# Patient Record
Sex: MALE | Race: BLACK OR AFRICAN AMERICAN | NOT HISPANIC OR LATINO | Employment: OTHER | RURAL
[De-identification: names, ages, dates, MRNs, and addresses within clinical notes are randomized per-mention and may not be internally consistent; named-entity substitution may affect disease eponyms.]

---

## 2019-04-11 ENCOUNTER — HISTORICAL (OUTPATIENT)
Dept: ADMINISTRATIVE | Facility: HOSPITAL | Age: 84
End: 2019-04-11

## 2019-04-12 LAB
LAB AP CLINICAL INFORMATION: NORMAL
LAB AP COMMENTS: NORMAL
LAB AP DIAGNOSIS - HISTORICAL: NORMAL
LAB AP GROSS PATHOLOGY - HISTORICAL: NORMAL
LAB AP SPECIMEN SUBMITTED - HISTORICAL: NORMAL

## 2020-11-25 ENCOUNTER — HISTORICAL (OUTPATIENT)
Dept: ADMINISTRATIVE | Facility: HOSPITAL | Age: 85
End: 2020-11-25

## 2020-11-25 LAB
ANION GAP SERPL CALCULATED.3IONS-SCNC: 12 MMOL/L
BASOPHILS # BLD AUTO: 0.02 X10E3/UL (ref 0–0.2)
BASOPHILS NFR BLD AUTO: 0.5 % (ref 0–1)
BUN SERPL-MCNC: 17 MG/DL (ref 9–20)
CALCIUM SERPL-MCNC: 9.9 MG/DL (ref 8.5–10.1)
CHLORIDE SERPL-SCNC: 105 MMOL/L (ref 98–107)
CO2 SERPL-SCNC: 29 MMOL/L (ref 21–32)
CREAT SERPL-MCNC: 0.88 MG/DL (ref 0.66–1.25)
EOSINOPHIL # BLD AUTO: 0.29 X10E3/UL (ref 0–0.5)
EOSINOPHIL NFR BLD AUTO: 6.7 % (ref 1–4)
ERYTHROCYTE [DISTWIDTH] IN BLOOD BY AUTOMATED COUNT: 12.4 % (ref 11.5–14.5)
GLUCOSE SERPL-MCNC: 93 MG/DL (ref 74–106)
HCT VFR BLD AUTO: 37.1 % (ref 40–54)
HGB BLD-MCNC: 12.2 G/DL (ref 13.5–18)
LYMPHOCYTES # BLD AUTO: 1.68 X10E3/UL (ref 1–4.8)
LYMPHOCYTES NFR BLD AUTO: 38.5 % (ref 27–41)
MCH RBC QN AUTO: 30 PG (ref 27–31)
MCHC RBC AUTO-ENTMCNC: 32.9 G/DL (ref 32–36)
MCV RBC AUTO: 91 FL (ref 80–96)
MONOCYTES # BLD AUTO: 0.6 X10E3/UL (ref 0–0.8)
MONOCYTES NFR BLD AUTO: 13.8 % (ref 2–6)
MPC BLD CALC-MCNC: 11 FL (ref 9.4–12.4)
NEUTROPHILS # BLD AUTO: 1.77 X10E3/UL (ref 1.8–7.7)
NEUTROPHILS NFR BLD AUTO: 40.5 % (ref 53–65)
PLATELET # BLD AUTO: 156 X10E3/UL (ref 150–400)
POTASSIUM SERPL-SCNC: 4.1 MMOL/L (ref 3.5–5.1)
RBC # BLD AUTO: 4.07 X10E6/UL (ref 4.6–6.2)
SODIUM SERPL-SCNC: 142 MMOL/L (ref 136–145)
WBC # BLD AUTO: 4.36 X10E3/UL (ref 4.5–11)

## 2021-04-19 ENCOUNTER — OFFICE VISIT (OUTPATIENT)
Dept: UROLOGY | Facility: CLINIC | Age: 86
End: 2021-04-19
Payer: MEDICARE

## 2021-04-19 VITALS
BODY MASS INDEX: 21.47 KG/M2 | SYSTOLIC BLOOD PRESSURE: 153 MMHG | DIASTOLIC BLOOD PRESSURE: 83 MMHG | HEART RATE: 64 BPM | WEIGHT: 150 LBS | HEIGHT: 70 IN

## 2021-04-19 DIAGNOSIS — C61 MALIGNANT NEOPLASM OF PROSTATE: Primary | ICD-10-CM

## 2021-04-19 DIAGNOSIS — N41.1 CHRONIC PROSTATITIS: ICD-10-CM

## 2021-04-19 DIAGNOSIS — C61 MALIGNANT TUMOR OF PROSTATE: Primary | ICD-10-CM

## 2021-04-19 DIAGNOSIS — N40.2 PROSTATE NODULE: ICD-10-CM

## 2021-04-19 DIAGNOSIS — R97.20 ELEVATED PSA: ICD-10-CM

## 2021-04-19 PROCEDURE — 99214 PR OFFICE/OUTPT VISIT, EST, LEVL IV, 30-39 MIN: ICD-10-PCS | Mod: S$PBB,,, | Performed by: UROLOGY

## 2021-04-19 PROCEDURE — 99214 OFFICE O/P EST MOD 30 MIN: CPT | Mod: S$PBB,,, | Performed by: UROLOGY

## 2021-04-19 PROCEDURE — 99999 PR PBB SHADOW E&M-EST. PATIENT-LVL III: ICD-10-PCS | Mod: PBBFAC,,, | Performed by: UROLOGY

## 2021-04-19 PROCEDURE — 99999 PR PBB SHADOW E&M-EST. PATIENT-LVL III: CPT | Mod: PBBFAC,,, | Performed by: UROLOGY

## 2021-04-19 PROCEDURE — 99213 OFFICE O/P EST LOW 20 MIN: CPT | Mod: PBBFAC | Performed by: UROLOGY

## 2021-04-19 RX ORDER — ATORVASTATIN CALCIUM 20 MG/1
20 TABLET, FILM COATED ORAL DAILY
COMMUNITY
Start: 2021-04-07 | End: 2021-07-09 | Stop reason: SDUPTHER

## 2021-04-19 RX ORDER — LABETALOL 100 MG/1
100 TABLET, FILM COATED ORAL 2 TIMES DAILY
COMMUNITY
Start: 2021-04-16 | End: 2021-07-09 | Stop reason: SDUPTHER

## 2021-04-19 RX ORDER — AMLODIPINE BESYLATE 5 MG/1
5 TABLET ORAL 2 TIMES DAILY
COMMUNITY
Start: 2021-03-18 | End: 2021-07-09 | Stop reason: SDUPTHER

## 2021-06-03 DIAGNOSIS — E78.5 HYPERLIPIDEMIA, UNSPECIFIED HYPERLIPIDEMIA TYPE: Primary | ICD-10-CM

## 2021-06-03 RX ORDER — ATORVASTATIN CALCIUM 20 MG/1
20 TABLET, FILM COATED ORAL DAILY
Qty: 30 TABLET | Refills: 0 | Status: CANCELLED | OUTPATIENT
Start: 2021-06-03

## 2021-07-09 ENCOUNTER — OFFICE VISIT (OUTPATIENT)
Dept: FAMILY MEDICINE | Facility: CLINIC | Age: 86
End: 2021-07-09
Payer: MEDICARE

## 2021-07-09 VITALS
SYSTOLIC BLOOD PRESSURE: 153 MMHG | BODY MASS INDEX: 20.95 KG/M2 | DIASTOLIC BLOOD PRESSURE: 71 MMHG | WEIGHT: 146 LBS | HEART RATE: 58 BPM

## 2021-07-09 DIAGNOSIS — E78.5 HYPERLIPIDEMIA, UNSPECIFIED HYPERLIPIDEMIA TYPE: ICD-10-CM

## 2021-07-09 DIAGNOSIS — I10 ESSENTIAL (PRIMARY) HYPERTENSION: Primary | ICD-10-CM

## 2021-07-09 PROCEDURE — 99213 OFFICE O/P EST LOW 20 MIN: CPT | Mod: ,,, | Performed by: NURSE PRACTITIONER

## 2021-07-09 PROCEDURE — 99213 PR OFFICE/OUTPT VISIT, EST, LEVL III, 20-29 MIN: ICD-10-PCS | Mod: ,,, | Performed by: NURSE PRACTITIONER

## 2021-07-09 RX ORDER — LABETALOL 100 MG/1
100 TABLET, FILM COATED ORAL 2 TIMES DAILY
Qty: 180 TABLET | Refills: 0 | Status: SHIPPED | OUTPATIENT
Start: 2021-07-09 | End: 2021-10-20

## 2021-07-09 RX ORDER — AMLODIPINE BESYLATE 5 MG/1
5 TABLET ORAL 2 TIMES DAILY
Qty: 90 TABLET | Refills: 0 | Status: SHIPPED | OUTPATIENT
Start: 2021-07-09 | End: 2021-10-20

## 2021-07-09 RX ORDER — ATORVASTATIN CALCIUM 20 MG/1
20 TABLET, FILM COATED ORAL DAILY
Qty: 90 TABLET | Refills: 0 | Status: SHIPPED | OUTPATIENT
Start: 2021-07-09 | End: 2021-10-20

## 2021-07-16 DIAGNOSIS — E78.5 HYPERLIPIDEMIA, UNSPECIFIED HYPERLIPIDEMIA TYPE: Primary | ICD-10-CM

## 2021-07-16 DIAGNOSIS — I10 ESSENTIAL (PRIMARY) HYPERTENSION: ICD-10-CM

## 2021-10-19 ENCOUNTER — TELEPHONE (OUTPATIENT)
Dept: FAMILY MEDICINE | Facility: CLINIC | Age: 86
End: 2021-10-19

## 2021-10-19 DIAGNOSIS — I10 ESSENTIAL (PRIMARY) HYPERTENSION: ICD-10-CM

## 2021-10-20 ENCOUNTER — OFFICE VISIT (OUTPATIENT)
Dept: UROLOGY | Facility: CLINIC | Age: 86
End: 2021-10-20
Payer: MEDICARE

## 2021-10-20 VITALS
HEIGHT: 70 IN | WEIGHT: 142 LBS | BODY MASS INDEX: 20.33 KG/M2 | HEART RATE: 68 BPM | SYSTOLIC BLOOD PRESSURE: 162 MMHG | DIASTOLIC BLOOD PRESSURE: 82 MMHG

## 2021-10-20 DIAGNOSIS — R97.20 ELEVATED PSA: ICD-10-CM

## 2021-10-20 DIAGNOSIS — C61 MALIGNANT NEOPLASM OF PROSTATE: Primary | ICD-10-CM

## 2021-10-20 DIAGNOSIS — I10 ESSENTIAL (PRIMARY) HYPERTENSION: ICD-10-CM

## 2021-10-20 DIAGNOSIS — N32.0 BLADDER OUTLET OBSTRUCTION: ICD-10-CM

## 2021-10-20 DIAGNOSIS — E78.5 HYPERLIPIDEMIA, UNSPECIFIED HYPERLIPIDEMIA TYPE: ICD-10-CM

## 2021-10-20 PROCEDURE — 99213 PR OFFICE/OUTPT VISIT, EST, LEVL III, 20-29 MIN: ICD-10-PCS | Mod: S$PBB,,, | Performed by: UROLOGY

## 2021-10-20 PROCEDURE — 99213 OFFICE O/P EST LOW 20 MIN: CPT | Mod: S$PBB,,, | Performed by: UROLOGY

## 2021-10-20 PROCEDURE — 99214 OFFICE O/P EST MOD 30 MIN: CPT | Mod: PBBFAC | Performed by: UROLOGY

## 2021-10-20 RX ORDER — AMLODIPINE BESYLATE 5 MG/1
TABLET ORAL
Qty: 42 TABLET | Refills: 0 | Status: SHIPPED | OUTPATIENT
Start: 2021-10-20 | End: 2021-10-22 | Stop reason: SDUPTHER

## 2021-10-20 RX ORDER — ATORVASTATIN CALCIUM 20 MG/1
TABLET, FILM COATED ORAL
Qty: 21 TABLET | Refills: 0 | Status: SHIPPED | OUTPATIENT
Start: 2021-10-20 | End: 2021-11-09 | Stop reason: SDUPTHER

## 2021-10-20 RX ORDER — LABETALOL 100 MG/1
TABLET, FILM COATED ORAL
Qty: 42 TABLET | Refills: 0 | Status: SHIPPED | OUTPATIENT
Start: 2021-10-20 | End: 2021-10-22 | Stop reason: SDUPTHER

## 2021-10-22 DIAGNOSIS — I10 ESSENTIAL (PRIMARY) HYPERTENSION: ICD-10-CM

## 2021-10-22 RX ORDER — AMLODIPINE BESYLATE 5 MG/1
5 TABLET ORAL 2 TIMES DAILY
Qty: 60 TABLET | Refills: 0 | Status: SHIPPED | OUTPATIENT
Start: 2021-10-22 | End: 2021-11-09 | Stop reason: SDUPTHER

## 2021-10-22 RX ORDER — LABETALOL 100 MG/1
100 TABLET, FILM COATED ORAL 2 TIMES DAILY
Qty: 60 TABLET | Refills: 0 | Status: SHIPPED | OUTPATIENT
Start: 2021-10-22 | End: 2021-11-09 | Stop reason: SDUPTHER

## 2021-11-09 ENCOUNTER — OFFICE VISIT (OUTPATIENT)
Dept: FAMILY MEDICINE | Facility: CLINIC | Age: 86
End: 2021-11-09
Payer: MEDICARE

## 2021-11-09 VITALS
BODY MASS INDEX: 20.62 KG/M2 | OXYGEN SATURATION: 98 % | WEIGHT: 144 LBS | DIASTOLIC BLOOD PRESSURE: 68 MMHG | HEART RATE: 54 BPM | HEIGHT: 70 IN | SYSTOLIC BLOOD PRESSURE: 145 MMHG | TEMPERATURE: 98 F

## 2021-11-09 DIAGNOSIS — E78.5 HYPERLIPIDEMIA, UNSPECIFIED HYPERLIPIDEMIA TYPE: ICD-10-CM

## 2021-11-09 DIAGNOSIS — I10 ESSENTIAL (PRIMARY) HYPERTENSION: Primary | ICD-10-CM

## 2021-11-09 PROCEDURE — 99213 OFFICE O/P EST LOW 20 MIN: CPT | Mod: ,,, | Performed by: NURSE PRACTITIONER

## 2021-11-09 PROCEDURE — 99213 PR OFFICE/OUTPT VISIT, EST, LEVL III, 20-29 MIN: ICD-10-PCS | Mod: ,,, | Performed by: NURSE PRACTITIONER

## 2021-11-09 RX ORDER — LABETALOL 100 MG/1
100 TABLET, FILM COATED ORAL 2 TIMES DAILY
Qty: 180 TABLET | Refills: 0 | Status: SHIPPED | OUTPATIENT
Start: 2021-11-09 | End: 2022-03-11 | Stop reason: SDUPTHER

## 2021-11-09 RX ORDER — AMLODIPINE BESYLATE 5 MG/1
5 TABLET ORAL 2 TIMES DAILY
Qty: 180 TABLET | Refills: 0 | Status: SHIPPED | OUTPATIENT
Start: 2021-11-09 | End: 2022-03-11 | Stop reason: SDUPTHER

## 2021-11-09 RX ORDER — ATORVASTATIN CALCIUM 20 MG/1
20 TABLET, FILM COATED ORAL NIGHTLY
Qty: 90 TABLET | Refills: 0 | Status: SHIPPED | OUTPATIENT
Start: 2021-11-09 | End: 2022-04-04 | Stop reason: SDUPTHER

## 2021-11-12 ENCOUNTER — HOSPITAL ENCOUNTER (EMERGENCY)
Facility: HOSPITAL | Age: 86
Discharge: HOME | End: 2021-11-12
Payer: MEDICARE

## 2021-11-12 VITALS
DIASTOLIC BLOOD PRESSURE: 71 MMHG | BODY MASS INDEX: 22.98 KG/M2 | WEIGHT: 143 LBS | SYSTOLIC BLOOD PRESSURE: 138 MMHG | TEMPERATURE: 98 F | RESPIRATION RATE: 16 BRPM | HEART RATE: 58 BPM | HEIGHT: 66 IN | OXYGEN SATURATION: 99 %
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
## 2021-11-12 DIAGNOSIS — S00.83XA FACIAL CONTUSION, INITIAL ENCOUNTER: ICD-10-CM

## 2021-11-12 DIAGNOSIS — S80.212A ABRASION, LEFT KNEE, INITIAL ENCOUNTER: ICD-10-CM

## 2021-11-12 DIAGNOSIS — S09.90XA INJURY OF HEAD, INITIAL ENCOUNTER: Primary | ICD-10-CM

## 2021-11-12 LAB
ANION GAP SERPL CALCULATED.3IONS-SCNC: 9 MMOL/L (ref 7–16)
BASOPHILS # BLD AUTO: 0.01 K/UL (ref 0–0.2)
BASOPHILS NFR BLD AUTO: 0.2 % (ref 0–1)
BILIRUB UR QL STRIP: NEGATIVE
BUN SERPL-MCNC: 16 MG/DL (ref 7–18)
BUN/CREAT SERPL: 19 (ref 6–20)
CALCIUM SERPL-MCNC: 9 MG/DL (ref 8.5–10.1)
CHLORIDE SERPL-SCNC: 107 MMOL/L (ref 98–107)
CLARITY UR: CLEAR
CO2 SERPL-SCNC: 29 MMOL/L (ref 21–32)
COLOR UR: YELLOW
CREAT SERPL-MCNC: 0.85 MG/DL (ref 0.7–1.3)
DIFFERENTIAL METHOD BLD: ABNORMAL
EOSINOPHIL # BLD AUTO: 0.27 K/UL (ref 0–0.5)
EOSINOPHIL NFR BLD AUTO: 5.6 % (ref 1–4)
ERYTHROCYTE [DISTWIDTH] IN BLOOD BY AUTOMATED COUNT: 12.2 % (ref 11.5–14.5)
GLUCOSE SERPL-MCNC: 93 MG/DL (ref 74–106)
GLUCOSE UR STRIP-MCNC: NEGATIVE MG/DL
HCT VFR BLD AUTO: 37.4 % (ref 40–54)
HGB BLD-MCNC: 12.1 G/DL (ref 13.5–18)
KETONES UR STRIP-SCNC: NEGATIVE MG/DL
LEUKOCYTE ESTERASE UR QL STRIP: NEGATIVE
LYMPHOCYTES # BLD AUTO: 1.45 K/UL (ref 1–4.8)
LYMPHOCYTES NFR BLD AUTO: 30 % (ref 27–41)
MCH RBC QN AUTO: 30.4 PG (ref 27–31)
MCHC RBC AUTO-ENTMCNC: 32.4 G/DL (ref 32–36)
MCV RBC AUTO: 94 FL (ref 80–96)
MONOCYTES # BLD AUTO: 0.66 K/UL (ref 0–0.8)
MONOCYTES NFR BLD AUTO: 13.6 % (ref 2–6)
MPC BLD CALC-MCNC: 10.3 FL (ref 9.4–12.4)
NEUTROPHILS # BLD AUTO: 2.45 K/UL (ref 1.8–7.7)
NEUTROPHILS NFR BLD AUTO: 50.6 % (ref 53–65)
NITRITE UR QL STRIP: NEGATIVE
PH UR STRIP: 6.5 PH UNITS
PLATELET # BLD AUTO: 143 K/UL (ref 150–400)
POTASSIUM SERPL-SCNC: 4.3 MMOL/L (ref 3.5–5.1)
PROT UR QL STRIP: NEGATIVE
RBC # BLD AUTO: 3.98 M/UL (ref 4.6–6.2)
RBC # UR STRIP: NEGATIVE /UL
SODIUM SERPL-SCNC: 141 MMOL/L (ref 136–145)
SP GR UR STRIP: 1.02
UROBILINOGEN UR STRIP-ACNC: 0.2 MG/DL
WBC # BLD AUTO: 4.84 K/UL (ref 4.5–11)

## 2021-11-12 PROCEDURE — 85025 COMPLETE CBC W/AUTO DIFF WBC: CPT | Performed by: NURSE PRACTITIONER

## 2021-11-12 PROCEDURE — 63600175 PHARM REV CODE 636 W HCPCS: Performed by: NURSE PRACTITIONER

## 2021-11-12 PROCEDURE — 90471 IMMUNIZATION ADMIN: CPT | Performed by: NURSE PRACTITIONER

## 2021-11-12 PROCEDURE — 81003 URINALYSIS AUTO W/O SCOPE: CPT | Performed by: NURSE PRACTITIONER

## 2021-11-12 PROCEDURE — 99285 EMERGENCY DEPT VISIT HI MDM: CPT | Mod: 25

## 2021-11-12 PROCEDURE — 36415 COLL VENOUS BLD VENIPUNCTURE: CPT | Performed by: NURSE PRACTITIONER

## 2021-11-12 PROCEDURE — 80048 BASIC METABOLIC PNL TOTAL CA: CPT | Performed by: NURSE PRACTITIONER

## 2021-11-12 PROCEDURE — 90715 TDAP VACCINE 7 YRS/> IM: CPT | Performed by: NURSE PRACTITIONER

## 2021-11-12 PROCEDURE — 99284 EMERGENCY DEPT VISIT MOD MDM: CPT | Mod: GF | Performed by: NURSE PRACTITIONER

## 2021-11-12 RX ADMIN — TETANUS TOXOID, REDUCED DIPHTHERIA TOXOID AND ACELLULAR PERTUSSIS VACCINE, ADSORBED 0.5 ML: 5; 2.5; 8; 8; 2.5 SUSPENSION INTRAMUSCULAR at 09:11

## 2021-11-13 ENCOUNTER — TELEPHONE (OUTPATIENT)
Dept: EMERGENCY MEDICINE | Facility: HOSPITAL | Age: 86
End: 2021-11-13
Payer: MEDICARE

## 2022-03-11 ENCOUNTER — OFFICE VISIT (OUTPATIENT)
Dept: FAMILY MEDICINE | Facility: CLINIC | Age: 87
End: 2022-03-11
Payer: MEDICARE

## 2022-03-11 VITALS
WEIGHT: 143 LBS | HEART RATE: 47 BPM | DIASTOLIC BLOOD PRESSURE: 76 MMHG | BODY MASS INDEX: 22.98 KG/M2 | SYSTOLIC BLOOD PRESSURE: 162 MMHG | HEIGHT: 66 IN

## 2022-03-11 DIAGNOSIS — I10 ESSENTIAL (PRIMARY) HYPERTENSION: ICD-10-CM

## 2022-03-11 DIAGNOSIS — Z71.89 COMPLEX CARE COORDINATION: ICD-10-CM

## 2022-03-11 PROCEDURE — 99213 OFFICE O/P EST LOW 20 MIN: CPT | Mod: ,,, | Performed by: NURSE PRACTITIONER

## 2022-03-11 PROCEDURE — 99213 PR OFFICE/OUTPT VISIT, EST, LEVL III, 20-29 MIN: ICD-10-PCS | Mod: ,,, | Performed by: NURSE PRACTITIONER

## 2022-03-11 RX ORDER — LABETALOL 100 MG/1
100 TABLET, FILM COATED ORAL 2 TIMES DAILY
Qty: 180 TABLET | Refills: 0 | Status: SHIPPED | OUTPATIENT
Start: 2022-03-11 | End: 2022-04-04 | Stop reason: SDUPTHER

## 2022-03-11 RX ORDER — AMLODIPINE BESYLATE 5 MG/1
5 TABLET ORAL 2 TIMES DAILY
Qty: 180 TABLET | Refills: 0 | Status: SHIPPED | OUTPATIENT
Start: 2022-03-11 | End: 2022-04-04 | Stop reason: SDUPTHER

## 2022-03-11 NOTE — PROGRESS NOTES
"Subjective:         Patient ID: Gavin Madsen is a 87 y.o. male.    Chief Complaint: Medication Refill      Did not take bp meds today      Review of Systems   Constitutional: Negative.    Eyes: Negative.    Respiratory: Negative.    Cardiovascular: Negative.    Gastrointestinal: Negative.    Endocrine: Negative.    Genitourinary: Negative.    Musculoskeletal: Negative.    Neurological: Negative.    Psychiatric/Behavioral: Negative.        Patient Active Problem List   Diagnosis    Essential (primary) hypertension    Hyperlipidemia           Objective:        BP (!) 162/76   Pulse (!) 47   Ht 5' 6" (1.676 m)   Wt 64.9 kg (143 lb)   BMI 23.08 kg/m²     Estimated body mass index is 23.08 kg/m² as calculated from the following:    Height as of this encounter: 5' 6" (1.676 m).    Weight as of this encounter: 64.9 kg (143 lb).    Physical Exam  Vitals reviewed.   Constitutional:       Appearance: Normal appearance.   Eyes:      Extraocular Movements: Extraocular movements intact.   Cardiovascular:      Rate and Rhythm: Normal rate and regular rhythm.      Heart sounds: Normal heart sounds.   Pulmonary:      Effort: Pulmonary effort is normal.      Breath sounds: Normal breath sounds.   Abdominal:      General: Abdomen is flat.      Palpations: Abdomen is soft.   Musculoskeletal:         General: Normal range of motion.   Skin:     General: Skin is warm and dry.   Neurological:      Mental Status: He is alert.   Psychiatric:         Behavior: Behavior normal.             Assessment:         1. Essential (primary) hypertension            Plan:         Problem List Items Addressed This Visit        Cardiac/Vascular    Essential (primary) hypertension                Follow Up:     No follow-ups on file.          Joselin Meadows        "

## 2022-04-04 ENCOUNTER — OFFICE VISIT (OUTPATIENT)
Dept: FAMILY MEDICINE | Facility: CLINIC | Age: 87
End: 2022-04-04
Payer: MEDICARE

## 2022-04-04 VITALS
WEIGHT: 143 LBS | DIASTOLIC BLOOD PRESSURE: 71 MMHG | TEMPERATURE: 98 F | BODY MASS INDEX: 22.98 KG/M2 | SYSTOLIC BLOOD PRESSURE: 136 MMHG | HEIGHT: 66 IN | OXYGEN SATURATION: 99 % | HEART RATE: 56 BPM

## 2022-04-04 DIAGNOSIS — I10 ESSENTIAL HYPERTENSION: Primary | ICD-10-CM

## 2022-04-04 DIAGNOSIS — I10 ESSENTIAL (PRIMARY) HYPERTENSION: ICD-10-CM

## 2022-04-04 DIAGNOSIS — E78.5 HYPERLIPIDEMIA, UNSPECIFIED HYPERLIPIDEMIA TYPE: ICD-10-CM

## 2022-04-04 PROCEDURE — 85025 CBC WITH DIFFERENTIAL: ICD-10-PCS | Mod: ,,, | Performed by: CLINICAL MEDICAL LABORATORY

## 2022-04-04 PROCEDURE — 99214 PR OFFICE/OUTPT VISIT, EST, LEVL IV, 30-39 MIN: ICD-10-PCS | Mod: ,,, | Performed by: NURSE PRACTITIONER

## 2022-04-04 PROCEDURE — 99214 OFFICE O/P EST MOD 30 MIN: CPT | Mod: ,,, | Performed by: NURSE PRACTITIONER

## 2022-04-04 PROCEDURE — 80053 COMPREHENSIVE METABOLIC PANEL: ICD-10-PCS | Mod: ,,, | Performed by: CLINICAL MEDICAL LABORATORY

## 2022-04-04 PROCEDURE — 80053 COMPREHEN METABOLIC PANEL: CPT | Mod: ,,, | Performed by: CLINICAL MEDICAL LABORATORY

## 2022-04-04 PROCEDURE — 85025 COMPLETE CBC W/AUTO DIFF WBC: CPT | Mod: ,,, | Performed by: CLINICAL MEDICAL LABORATORY

## 2022-04-04 RX ORDER — ATORVASTATIN CALCIUM 20 MG/1
20 TABLET, FILM COATED ORAL NIGHTLY
Qty: 90 TABLET | Refills: 0 | Status: SHIPPED | OUTPATIENT
Start: 2022-04-04 | End: 2022-06-30 | Stop reason: SDUPTHER

## 2022-04-04 RX ORDER — AMLODIPINE BESYLATE 5 MG/1
5 TABLET ORAL 2 TIMES DAILY
Qty: 180 TABLET | Refills: 0 | Status: SHIPPED | OUTPATIENT
Start: 2022-04-04 | End: 2022-09-08 | Stop reason: SDUPTHER

## 2022-04-04 RX ORDER — LABETALOL 100 MG/1
100 TABLET, FILM COATED ORAL 2 TIMES DAILY
Qty: 180 TABLET | Refills: 0 | Status: SHIPPED | OUTPATIENT
Start: 2022-04-04 | End: 2022-09-08 | Stop reason: SDUPTHER

## 2022-04-04 NOTE — PATIENT INSTRUCTIONS
Lab obtained in clinic today, we will notify you of results and any necessary changes to plan of care     Refills on medication   Follow up in three months and as needed

## 2022-04-05 LAB
ALBUMIN SERPL BCP-MCNC: 4 G/DL (ref 3.5–5)
ALBUMIN/GLOB SERPL: 1.3 {RATIO}
ALP SERPL-CCNC: 70 U/L (ref 45–115)
ALT SERPL W P-5'-P-CCNC: 29 U/L (ref 16–61)
ANION GAP SERPL CALCULATED.3IONS-SCNC: 5 MMOL/L (ref 7–16)
AST SERPL W P-5'-P-CCNC: 21 U/L (ref 15–37)
BASOPHILS # BLD AUTO: 0.05 K/UL (ref 0–0.2)
BASOPHILS NFR BLD AUTO: 1 % (ref 0–1)
BASOPHILS NFR BLD MANUAL: 2 % (ref 0–1)
BILIRUB SERPL-MCNC: 0.6 MG/DL (ref 0–1.2)
BUN SERPL-MCNC: 20 MG/DL (ref 7–18)
BUN/CREAT SERPL: 21 (ref 6–20)
CALCIUM SERPL-MCNC: 9.6 MG/DL (ref 8.5–10.1)
CHLORIDE SERPL-SCNC: 110 MMOL/L (ref 98–107)
CO2 SERPL-SCNC: 30 MMOL/L (ref 21–32)
CREAT SERPL-MCNC: 0.97 MG/DL (ref 0.7–1.3)
DIFFERENTIAL METHOD BLD: ABNORMAL
EOSINOPHIL # BLD AUTO: 0.3 K/UL (ref 0–0.5)
EOSINOPHIL NFR BLD AUTO: 5.7 % (ref 1–4)
EOSINOPHIL NFR BLD MANUAL: 11 % (ref 1–4)
ERYTHROCYTE [DISTWIDTH] IN BLOOD BY AUTOMATED COUNT: 12.4 % (ref 11.5–14.5)
GLOBULIN SER-MCNC: 3 G/DL (ref 2–4)
GLUCOSE SERPL-MCNC: 96 MG/DL (ref 74–106)
HCT VFR BLD AUTO: 37.6 % (ref 40–54)
HGB BLD-MCNC: 12 G/DL (ref 13.5–18)
IMM GRANULOCYTES # BLD AUTO: 0 K/UL (ref 0–0.04)
IMM GRANULOCYTES NFR BLD: 0 % (ref 0–0.4)
LYMPHOCYTES # BLD AUTO: 2.11 K/UL (ref 1–4.8)
LYMPHOCYTES NFR BLD AUTO: 40.3 % (ref 27–41)
LYMPHOCYTES NFR BLD MANUAL: 49 % (ref 27–41)
MCH RBC QN AUTO: 30.1 PG (ref 27–31)
MCHC RBC AUTO-ENTMCNC: 31.9 G/DL (ref 32–36)
MCV RBC AUTO: 94.2 FL (ref 80–96)
MONOCYTES # BLD AUTO: 0.65 K/UL (ref 0–0.8)
MONOCYTES NFR BLD AUTO: 12.4 % (ref 2–6)
MONOCYTES NFR BLD MANUAL: 10 % (ref 2–6)
MPC BLD CALC-MCNC: 11.6 FL (ref 9.4–12.4)
NEUTROPHILS # BLD AUTO: 2.12 K/UL (ref 1.8–7.7)
NEUTROPHILS NFR BLD AUTO: 40.6 % (ref 53–65)
NEUTS SEG NFR BLD MANUAL: 28 % (ref 50–62)
NRBC # BLD AUTO: 0 X10E3/UL
NRBC, AUTO (.00): 0 %
PLATELET # BLD AUTO: 152 K/UL (ref 150–400)
PLATELET MORPHOLOGY: NORMAL
POTASSIUM SERPL-SCNC: 4.3 MMOL/L (ref 3.5–5.1)
PROT SERPL-MCNC: 7 G/DL (ref 6.4–8.2)
RBC # BLD AUTO: 3.99 M/UL (ref 4.6–6.2)
RBC MORPH BLD: NORMAL
SODIUM SERPL-SCNC: 141 MMOL/L (ref 136–145)
WBC # BLD AUTO: 5.23 K/UL (ref 4.5–11)

## 2022-04-05 PROCEDURE — 82043 MICROALBUMIN / CREATININE RATIO URINE: ICD-10-PCS | Mod: ,,, | Performed by: CLINICAL MEDICAL LABORATORY

## 2022-04-05 PROCEDURE — 82570 MICROALBUMIN / CREATININE RATIO URINE: ICD-10-PCS | Mod: ,,, | Performed by: CLINICAL MEDICAL LABORATORY

## 2022-04-05 PROCEDURE — 82043 UR ALBUMIN QUANTITATIVE: CPT | Mod: ,,, | Performed by: CLINICAL MEDICAL LABORATORY

## 2022-04-05 PROCEDURE — 82570 ASSAY OF URINE CREATININE: CPT | Mod: ,,, | Performed by: CLINICAL MEDICAL LABORATORY

## 2022-04-05 NOTE — PROGRESS NOTES
Clinic note     Patient name: Gavin Madsen is a 87 y.o. male   Chief compliant   Chief Complaint   Patient presents with    Medication Refill     Denies any problems at htsi time.        Subjective     History of present illness   In clinic for routine follow up and medication refills   Denies any acute concerns at present   Daughter is  present during office visit today   Hx of prostate cancer, followed by Dr Chan for urology  Past Medical History: hypertension, hyperlipidemia, prostate cancer      Social History     Tobacco Use    Smoking status: Never Smoker    Smokeless tobacco: Never Used   Substance Use Topics    Alcohol use: Never    Drug use: Never       Review of patient's allergies indicates:   Allergen Reactions    Aspirin        Past Medical History:   Diagnosis Date    Elevated PSA     Hypertension     Malignant neoplasm of prostate        Past Surgical History:   Procedure Laterality Date    BIOPSY WITH TRANSRECTAL ULTRASOUND (TRUS) GUIDANCE Bilateral 04/11/2019    [place] 4+3=7        History reviewed. No pertinent family history.      Current Outpatient Medications:     amLODIPine (NORVASC) 5 MG tablet, Take 1 tablet (5 mg total) by mouth 2 (two) times daily., Disp: 180 tablet, Rfl: 0    atorvastatin (LIPITOR) 20 MG tablet, Take 1 tablet (20 mg total) by mouth every evening., Disp: 90 tablet, Rfl: 0    labetaloL (NORMODYNE) 100 MG tablet, Take 1 tablet (100 mg total) by mouth 2 (two) times daily., Disp: 180 tablet, Rfl: 0    Review of Systems   Constitutional: Negative for activity change, appetite change, chills, fatigue, fever and unexpected weight change.   Eyes: Negative for visual disturbance.   Respiratory: Negative for cough and shortness of breath.    Cardiovascular: Negative for chest pain, palpitations and leg swelling.   Gastrointestinal: Negative for abdominal pain, blood in stool, change in bowel habit, constipation, diarrhea, nausea, vomiting and change in bowel habit.  "  Endocrine: Negative for polydipsia and polyuria.   Genitourinary: Negative for difficulty urinating and dysuria.   Musculoskeletal: Negative for arthralgias, gait problem and myalgias.   Integumentary:  Negative for rash and wound.   Neurological: Negative for dizziness, light-headedness, headaches, disturbances in coordination and coordination difficulties.   Psychiatric/Behavioral: Negative for confusion, dysphoric mood and sleep disturbance. The patient is not nervous/anxious.        Objective     /71   Pulse (!) 56   Temp 97.6 °F (36.4 °C)   Ht 5' 6" (1.676 m)   Wt 64.9 kg (143 lb)   SpO2 99%   BMI 23.08 kg/m²     Physical Exam   Constitutional: He is oriented to person, place, and time. No distress.   HENT:   Head: Normocephalic and atraumatic.   Mouth/Throat: Mucous membranes are moist.   Eyes: Pupils are equal, round, and reactive to light. Conjunctivae are normal.   Cardiovascular: Regular rhythm. Bradycardia present.   Asymptomatic bradycardia    Pulmonary/Chest: Effort normal and breath sounds normal. No respiratory distress. He has no wheezes. He has no rhonchi. He has no rales.   Abdominal: Soft. Normal appearance and bowel sounds are normal. He exhibits no distension. There is no abdominal tenderness.   Musculoskeletal:         General: Normal range of motion.      Cervical back: Normal range of motion and neck supple.      Right lower leg: No edema.      Left lower leg: No edema.   Neurological: He is alert and oriented to person, place, and time. Gait normal.   Skin: Skin is warm and dry.   Psychiatric: His behavior is normal. Mood normal.       Lab Results   Component Value Date    WBC 4.84 11/12/2021    HGB 12.1 (L) 11/12/2021    HCT 37.4 (L) 11/12/2021    MCV 94.0 11/12/2021     (L) 11/12/2021       CMP  Sodium   Date Value Ref Range Status   11/12/2021 141 136 - 145 mmol/L Final     Potassium   Date Value Ref Range Status   11/12/2021 4.3 3.5 - 5.1 mmol/L Final     Chloride "   Date Value Ref Range Status   11/12/2021 107 98 - 107 mmol/L Final     CO2   Date Value Ref Range Status   11/12/2021 29 21 - 32 mmol/L Final     Glucose   Date Value Ref Range Status   11/12/2021 93 74 - 106 mg/dL Final     BUN   Date Value Ref Range Status   11/12/2021 16 7 - 18 mg/dL Final     Creatinine   Date Value Ref Range Status   11/12/2021 0.85 0.70 - 1.30 mg/dL Final     Calcium   Date Value Ref Range Status   11/12/2021 9.0 8.5 - 10.1 mg/dL Final     Total Protein   Date Value Ref Range Status   07/16/2021 7.3 6.4 - 8.2 g/dL Final     Albumin   Date Value Ref Range Status   07/16/2021 3.9 3.5 - 5.0 g/dL Final     Bilirubin, Total   Date Value Ref Range Status   07/16/2021 0.8 >0.0 - 1.2 mg/dL Final     Alk Phos   Date Value Ref Range Status   07/16/2021 66 45 - 115 U/L Final     AST   Date Value Ref Range Status   07/16/2021 32 15 - 37 U/L Final     ALT   Date Value Ref Range Status   07/16/2021 40 16 - 61 U/L Final     Anion Gap   Date Value Ref Range Status   11/12/2021 9 7 - 16 mmol/L Final     eGFR    Date Value Ref Range Status   11/12/2021 110 >=60 mL/min/1.73m² Final     eGFR   Date Value Ref Range Status   11/25/2020 88       No results found for: TSH  Lab Results   Component Value Date    CHOL 117 07/16/2021     Lab Results   Component Value Date    HDL 63 (H) 07/16/2021     Lab Results   Component Value Date    LDLCALC 38 07/16/2021     Lab Results   Component Value Date    TRIG 78 07/16/2021     Lab Results   Component Value Date    CHOLHDL 1.9 07/16/2021     No results found for: LABA1C, HGBA1C      Assessment and Plan   Essential hypertension  -     Microalbumin/Creatinine Ratio, Urine  -     CBC Auto Differential; Future; Expected date: 04/04/2022  -     Comprehensive Metabolic Panel; Future; Expected date: 04/04/2022    Hyperlipidemia, unspecified hyperlipidemia type  -     atorvastatin (LIPITOR) 20 MG tablet; Take 1 tablet (20 mg total) by mouth every evening.  Dispense:  90 tablet; Refill: 0    Essential (primary) hypertension  -     amLODIPine (NORVASC) 5 MG tablet; Take 1 tablet (5 mg total) by mouth 2 (two) times daily.  Dispense: 180 tablet; Refill: 0  -     labetaloL (NORMODYNE) 100 MG tablet; Take 1 tablet (100 mg total) by mouth 2 (two) times daily.  Dispense: 180 tablet; Refill: 0          Patient Instructions  Patient Instructions   Lab obtained in clinic today, we will notify you of results and any necessary changes to plan of care     Refills on medication   Follow up in three months and as needed

## 2022-04-06 LAB
CREAT UR-MCNC: 147 MG/DL (ref 39–259)
MICROALBUMIN UR-MCNC: 2.2 MG/DL (ref 0–2.8)
MICROALBUMIN/CREAT RATIO PNL UR: 15 MG/G (ref 0–30)

## 2022-04-25 ENCOUNTER — OFFICE VISIT (OUTPATIENT)
Dept: UROLOGY | Facility: CLINIC | Age: 87
End: 2022-04-25
Payer: MEDICARE

## 2022-04-25 VITALS
HEART RATE: 62 BPM | HEIGHT: 70 IN | DIASTOLIC BLOOD PRESSURE: 77 MMHG | WEIGHT: 143 LBS | BODY MASS INDEX: 20.47 KG/M2 | SYSTOLIC BLOOD PRESSURE: 154 MMHG

## 2022-04-25 DIAGNOSIS — N32.0 BLADDER OUTLET OBSTRUCTION: ICD-10-CM

## 2022-04-25 DIAGNOSIS — C61 MALIGNANT NEOPLASM OF PROSTATE: Primary | ICD-10-CM

## 2022-04-25 DIAGNOSIS — R97.20 ELEVATED PSA: ICD-10-CM

## 2022-04-25 DIAGNOSIS — N41.1 CHRONIC PROSTATITIS: ICD-10-CM

## 2022-04-25 PROCEDURE — 99213 OFFICE O/P EST LOW 20 MIN: CPT | Mod: S$PBB,,, | Performed by: UROLOGY

## 2022-04-25 PROCEDURE — 99214 OFFICE O/P EST MOD 30 MIN: CPT | Mod: PBBFAC | Performed by: UROLOGY

## 2022-04-25 PROCEDURE — 99213 PR OFFICE/OUTPT VISIT, EST, LEVL III, 20-29 MIN: ICD-10-PCS | Mod: S$PBB,,, | Performed by: UROLOGY

## 2022-04-25 NOTE — PROGRESS NOTES
Subjective:       Patient ID: Treasure Madsen is a 87 y.o. male.    Chief Complaint: Follow-up (6 month visit, PSA C61/)    Prostate Cancer Follow-up  2019 17:57  Test comment: right and left prostate bx's      SURGICAL PATHOLOGY REPORT      PATIENT: TREASURE MADSEN CASE NUMBER: [id number]    1935 AGE: 84 yrs SEX: M ACCOUNT NUMBER: [id number]   RACE: Black UNIT NUMBER: [id number]   ATTENDING DATE COLLECTED: 2019   Irvin Chan M.D.   PHYSICIAN:   DATE RECEIVED: 2019   DATE REPORTED: 2019            DIAGNOSIS:   A. 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 Prostate, left midline, biopsies:   - PROSTATIC ADENOCARCINOMA, BRYAN SCORE 3+4=7, INVOLVING   APPROXIMATELY 15% OF SUBMITTED TISSUE   - High-grade prostatic intraepithelial neoplasia (HGPIN)   C. Prostate, right lateral, biopsy:   - Prostate tissue with a small focus of atypical glands   - High-grade prostatic intraepithelial neoplasia (HGPIN)   D. Prostate, right midline, biopsies:   - High-grade prostatic intraepithelial neoplasia (HGPIN)   - Chronic inflammation      COMMENTS: The diagnosis of carcinoma is supported by the failure of immunohistochemistry for high molecular weight cytokeratin to demonstrate basal cells in the atypical   glands (performed on parts A, B, C, and D). The atypical glands in part C appear to lack basal cells and are suspicious for malignancy, but do not meet the   cytological and architectural threshold for a diagnosis of malignancy. Dr. Gonzales has reviewed selected slides and agrees with the interpretation.      Of note, this diagnosis places this patient in prognostic grade group 2 (out of 5) of a newly proposed consensus grading scheme (see reference below).      Reference: Diego et al. The 2014 International Society of Urological Pathology (ISUP)  Consensus Conference   on [place] Grading of Prostatic Carcinoma: Definition of Grading Patterns and Proposal for a New Grading System. Am J Surg Pathol. 2016 Feb;40(2):244-52.      SPECIMEN SUBMITTED:   A. Bxs left lateral prostate   B. Bxs left midline prostate   C. Bxs right lateral prostate   D. Bxs right midline prostate      GROSS DESCRIPTION:   A. The specimen is received in formalin and consists of four tan stringy biopsy fragments ranging in length from 0.4 to 1.8 cm entirely submitted in block A1.   B. The specimen is received in formalin and consists of three tan stringy fragments ranging in length from 1.4 to 3.2 cm in length with the longest divided. All submitted in block B1.   C. The specimen is received in formalin and consists of three tan stringy biopsy fragments ranging in length from 0.9 to 1.0 cm.   Additionally there are four tan fragments each measuring approximately 0.1 cm. All submitted in block C1.   D. The specimen is received in formalin and consists of three tan stringy biopsy fragments ranging in length from 1.3 to 1.7 cm entirely submitted in block D1. CAC/afl      MICROSCOPIC DESCRIPTION:   A microscopic examination has been performed.      Clinical History: Elevated PSA, PSA 10.800      Electronically Signed By:      Aaron Early MD   04/12/2019 14:32  ---------------------------     Urology History  Associated Symptoms:  Edema  Mr. Madsen is 84 years old. He was sent by Ms. Mc because of PSA elevation. He had a very high normal PSA in 2012 but none between then and last year. Patient has mild  bladder outlet obstructive symptoms with IPSS score of 2, and does not feel he needs any help with the way he is voiding. Nocturia x2. No family history of prostate cancer. No history of urinary tract infections, hematuria, or any other previous  problems. Patient in with his daughter for follow-up of PSA elevation.  (February 7, 2019)     Patient returned for follow-up of PSA  elevation to have another PSA drawn and for consideration of prostate biopsies. Patient in with his 2 daughters today. Stable with voiding without significant lower tract obstructive symptoms.  (April 11, 2019)     Mr. Madsen is in for first visit since his biopsies were made. I had recommended that he be managed conservatively because of his age. He was found to have prostate cancer on the biopsies and we had talked with his daughter about situation. Patient is doing well clinically and feels just as well as he did last time he was here when he had his biopsies.  He did not have his PSA drawn before he was seen. Patient in with his daughter, Kelley. (October 14, 2019)     Mr. Narayan has not been seen in nearly 1 year. He was in again today with his daughter Kelley. Patient feels he is voiding okay. No burning on urination and no visible blood in the urine. He has not had any hospitalizations or major health problems since he was last seen in 11 months ago. Last appointment delayed because of the coronavirus problem.  (September 16, 2020)     PSA Results:  Past PSA Results   PSA was 14.400 on April 19, 2021  PSA  was 17.000 on 9/16/2020  PSA was 12.300 on October 14, 2019  PSA was 10.800 on April 11, 2019  PSA was 9.760 on 11/16/2018  PSA was 11.300 on 8/15/2018  PSA was 3.86 on October 9, 2012  ---------------------------------------------------------------------------------------------------------------------------------------------------------------------------------------------------------------------------------------------------------------------------------------------------------------------------   PSA was 16.500 on October 20, 2021  PSA was 19.800 on April 25, 2022     All the above notes are from the old clinic system except for the PSA done today which is down slightly from last time.  Patient doing okay clinically.  Nocturia x2 with no worsening bladder outlet obstructive symptoms.  He has actually  gained 6 lb since last visit.  Patient in with his daughter. (April 19, 2021)     Mr. Madsen is in for 6 month follow-up of prostate cancer.  He had his PSA done and results are pending.  No new health issues that he is aware of.  8 lb weight loss since last visit.  he had gained 6 pounds last time.  Patient in with his daughter Jadyn.  (October 20, 2021)    Mr. Narayan is in for his 6 month follow-up for prostate cancer.  PSA has been drawn and is pending.  No worsening bladder outlet obstructive symptoms or any other  problems.  He had to have a trip to the emergency room in November because of a fall injuring his head but otherwise has had a good 6 months.  PSA pending.  (April 25, 2022)       Review of Systems      Objective:      Physical Exam  Constitutional:       Appearance: Normal appearance. He is normal weight.   Genitourinary:     Prostate: Normal.      Rectum: Normal.      Comments: Prostate 30-40 g firmer on the right side than left  Neurological:      Mental Status: He is alert.   Psychiatric:         Mood and Affect: Mood normal.         Behavior: Behavior normal.       urinalysis revealed 1 or 2 pus cells and 1-2 red cells.  Dipstick had moderate amount of blood with pH 6.5 and specific gravity 1.015.  Assessment:       Problem List Items Addressed This Visit    None     Visit Diagnoses     Malignant neoplasm of prostate    -  Primary    Relevant Orders    PSA, Total (Diagnostic)    Elevated PSA        Chronic prostatitis        Bladder outlet obstruction              Plan:     Patient doing okay clinically.  PSA returned after he left and is up slightly to 19.800.  It is going up so slowly that I think continued conservative management is in his best interest.  PSA reported to his daughter Jadyn, by telephone.  Six month appointment with PSA or sooner if needed.  *

## 2022-04-25 NOTE — PATIENT INSTRUCTIONS
Patient doing okay clinically.  PSA returned after he left and is up slightly to 19.800.  It is going up so slowly that I think continued conservative management is in his best interest.  PSA reported to his daughter Jadyn, by telephone.  Six month appointment with PSA or sooner if needed.

## 2022-06-08 NOTE — PROGRESS NOTES
[facility] THE [facility]      PATIENT NAME: Gavin Madsen   : 1935    AGE: 87 y.o. DATE: 06/10/2022   MRN: [id number]        Reason for Visit / Chief Complaint: Medicare AWV (Initial Medicare AWV )        Gavin Madsen presents for an Initial Medicare AWV today.     The following components were reviewed and updated:    Medical/Social/Family History:  Past Medical History:   Diagnosis Date    Elevated PSA     Hypertension     Malignant neoplasm of prostate         Family History   Problem Relation Age of Onset    Hypertension Father     Hypertension Mother     Diabetes Mother     Prostate cancer Brother     Cervical cancer Daughter     Hypertension Daughter     Lupus Son         Past Surgical History:   Procedure Laterality Date    BIOPSY WITH TRANSRECTAL ULTRASOUND (TRUS) GUIDANCE Bilateral 2019    Aylin 4+3=7       Social History     Tobacco Use   Smoking Status Never Smoker   Smokeless Tobacco Never Used       Social History     Substance and Sexual Activity   Alcohol Use Never         · Allergies and Current Medications     Review of patient's allergies indicates:   Allergen Reactions    Aspirin        Current Outpatient Medications:     amLODIPine (NORVASC) 5 MG tablet, Take 1 tablet (5 mg total) by mouth 2 (two) times daily., Disp: 180 tablet, Rfl: 0    atorvastatin (LIPITOR) 20 MG tablet, Take 1 tablet (20 mg total) by mouth every evening., Disp: 90 tablet, Rfl: 0    labetaloL (NORMODYNE) 100 MG tablet, Take 1 tablet (100 mg total) by mouth 2 (two) times daily., Disp: 180 tablet, Rfl: 0      · Health Risk Assessment   Fall Risk: no   Obesity: BMI Body mass index is 22.44 kg/m².   Advance Directive:  Does not have an advanced directive.  Verbal information given and written information provided on today's AVS.   Depression: PHQ9- 0   HTN:  DASH diet, exercise, weight management, med compliance, home BP monitoring, and follow-up discussed.  STI: not at  risk   Statin Use: yes      · Health Maintenance   Last eye exam: states it has been years, declines referral   Last CV screen with lipids: 07/16/2021   Diabetes screening with fasting glucose or A1c: 04/04/2022   Colonoscopy: has never had, advanced age   Flu Vaccine: declines   Pneumonia vaccines: declines   COVID vaccine:  Fred 03/04/2021 01/06/2022   Hep B vaccine: NA   DEXA: NA   Last PSA screen: 04/25/2022  AAA screening: NA   HIV Screening: not at risk  Hepatitis C Screen: not at risk  Low Dose CT Scan: NA    Health Maintenance Topics with due status: Not Due       Topic Last Completion Date    Lipid Panel 07/16/2021    TETANUS VACCINE 11/12/2021    PROSTATE-SPECIFIC ANTIGEN 04/25/2022    Influenza Vaccine Not Due     Health Maintenance Due   Topic Date Due    COVID-19 Vaccine (3 - Booster for Fred series) 05/06/2022         Lab results available in Epic or see dates from [facility] above:   Lab Results   Component Value Date    CHOL 117 07/16/2021     Lab Results   Component Value Date    HDL 63 (H) 07/16/2021     Lab Results   Component Value Date    LDLCALC 38 07/16/2021     Lab Results   Component Value Date    TRIG 78 07/16/2021         No results found for: LABA1C, HGBA1C    Sodium   Date Value Ref Range Status   04/04/2022 141 136 - 145 mmol/L Final     Potassium   Date Value Ref Range Status   04/04/2022 4.3 3.5 - 5.1 mmol/L Final     Chloride   Date Value Ref Range Status   04/04/2022 110 (H) 98 - 107 mmol/L Final     CO2   Date Value Ref Range Status   04/04/2022 30 21 - 32 mmol/L Final     Glucose   Date Value Ref Range Status   04/04/2022 96 74 - 106 mg/dL Final     BUN   Date Value Ref Range Status   04/04/2022 20 (H) 7 - 18 mg/dL Final     Creatinine   Date Value Ref Range Status   04/04/2022 0.97 0.70 - 1.30 mg/dL Final     Calcium   Date Value Ref Range Status   04/04/2022 9.6 8.5 - 10.1 mg/dL Final     Total Protein   Date Value Ref Range Status   04/04/2022 7.0 6.4 - 8.2 g/dL Final  "    Albumin   Date Value Ref Range Status   04/04/2022 4.0 3.5 - 5.0 g/dL Final     Bilirubin, Total   Date Value Ref Range Status   04/04/2022 0.6 0.0 - 1.2 mg/dL Final     Alk Phos   Date Value Ref Range Status   04/04/2022 70 45 - 115 U/L Final     AST   Date Value Ref Range Status   04/04/2022 21 15 - 37 U/L Final     ALT   Date Value Ref Range Status   04/04/2022 29 16 - 61 U/L Final     Anion Gap   Date Value Ref Range Status   04/04/2022 5 (L) 7 - 16 mmol/L Final     eGFR    Date Value Ref Range Status   11/12/2021 110 >=60 mL/min/1.73m² Final     eGFR   Date Value Ref Range Status   04/04/2022 78 >=60 mL/min/1.73m² Final         Lab Results   Component Value Date    PSA 19.800 (H) 04/25/2022    PSA 16.500 (H) 10/20/2021    PSA 14.400 (H) 04/19/2021           Incontinence  Bowel: no  Bladder: no      · Care Team:  GAYATHRI Mc P  PCP        Dr. Chan  Urology          **See Completed Assessments for Annual Wellness visit within the encounter summary    The following assessments were completed & reviewed:  · Depression Screening  · Cognitive function Screening  · Timed Get Up Test  · Whisper Test  · Vision Screen  · Health Risk Assessment  · Checklist of ADLs and IADLs    Patient scored 0/5 on mini-cog.  He is oriented and has normal behavior on exam today.  Answers all questions appropriately.  He has multiple family members that live next door to him and check on him and spend time with him daily.  Daughter usually accompanies him to clinic visits.  PCP will discuss any needs or concerns with her at follow up.    Objective  Vitals:    06/09/22 1434 06/09/22 1439   BP: (!) 148/72 121/68   Pulse: (!) 51    Resp: 12    Temp: 97.4 °F (36.3 °C)    SpO2: 100%    Weight: 63 kg (139 lb)    Height: 5' 6" (1.676 m)    PainSc: 0-No pain       Body mass index is 22.44 kg/m².  Ideal body weight: 63.8 kg (140 lb 10.5 oz)       Physical Exam  Vitals reviewed.   Constitutional:       Appearance: Normal " appearance.   HENT:      Head: Normocephalic and atraumatic.   Eyes:      Extraocular Movements: Extraocular movements intact.      Conjunctiva/sclera: Conjunctivae normal.      Pupils: Pupils are equal, round, and reactive to light.   Cardiovascular:      Rate and Rhythm: Normal rate and regular rhythm.      Heart sounds: Normal heart sounds.   Pulmonary:      Effort: Pulmonary effort is normal.      Breath sounds: Normal breath sounds.   Musculoskeletal:         General: Normal range of motion.      Cervical back: Normal range of motion and neck supple.   Skin:     General: Skin is warm and dry.   Neurological:      General: No focal deficit present.      Mental Status: He is alert and oriented to person, place, and time.   Psychiatric:         Mood and Affect: Mood normal.         Behavior: Behavior normal.           Assessment:     1. Encounter for initial annual wellness visit (AWV) in Medicare patient    2. Hyperlipidemia, unspecified hyperlipidemia type    3. Essential (primary) hypertension    4. Malignant neoplasm of prostate    5. BMI 22.0-22.9, adult       Problem List Items Addressed This Visit        Cardiac/Vascular    Essential (primary) hypertension    Hyperlipidemia       Oncology    Malignant neoplasm of prostate (Chronic)      Other Visit Diagnoses     Encounter for initial annual wellness visit (AWV) in Medicare patient    -  Primary    BMI 22.0-22.9, adult              Plan:    Referrals:   none     Advised to call office if does not hear from anyone with referral appt within 2-3 weeks to check on status of referral. Voiced understanding.      Discussed and provided with a screening schedule and personal prevention plan in accordance with USPSTF age appropriate recommendations and Medicare screening guidelines.   Education, counseling, and referrals were provided as needed.  After Visit Summary printed and given to patient which includes written education and a list of any referrals if  indicated.     Education including diet, exercise, falls and advanced directives discussed with patient and patient verbalized understanding.      F/u plan for yearly AWV.    Signature: Dr. Tigre Demarco MD

## 2022-06-09 ENCOUNTER — OFFICE VISIT (OUTPATIENT)
Dept: FAMILY MEDICINE | Facility: CLINIC | Age: 87
End: 2022-06-09
Payer: MEDICARE

## 2022-06-09 VITALS
WEIGHT: 139 LBS | BODY MASS INDEX: 22.34 KG/M2 | SYSTOLIC BLOOD PRESSURE: 121 MMHG | DIASTOLIC BLOOD PRESSURE: 68 MMHG | HEART RATE: 51 BPM | OXYGEN SATURATION: 100 % | TEMPERATURE: 97 F | HEIGHT: 66 IN | RESPIRATION RATE: 12 BRPM

## 2022-06-09 DIAGNOSIS — I10 ESSENTIAL (PRIMARY) HYPERTENSION: Chronic | ICD-10-CM

## 2022-06-09 DIAGNOSIS — E78.5 HYPERLIPIDEMIA, UNSPECIFIED HYPERLIPIDEMIA TYPE: Chronic | ICD-10-CM

## 2022-06-09 DIAGNOSIS — C61 MALIGNANT NEOPLASM OF PROSTATE: Chronic | ICD-10-CM

## 2022-06-09 DIAGNOSIS — Z00.00 ENCOUNTER FOR INITIAL ANNUAL WELLNESS VISIT (AWV) IN MEDICARE PATIENT: Primary | ICD-10-CM

## 2022-06-09 PROCEDURE — G0439 PR MEDICARE ANNUAL WELLNESS SUBSEQUENT VISIT: ICD-10-PCS | Mod: ,,, | Performed by: FAMILY MEDICINE

## 2022-06-09 PROCEDURE — G0439 PPPS, SUBSEQ VISIT: HCPCS | Mod: ,,, | Performed by: FAMILY MEDICINE

## 2022-06-09 NOTE — PATIENT INSTRUCTIONS
Counseling and Referral of Other Preventative  (Italic type indicates deductible and co-insurance are waived)    Patient Name: Gavin Madsen  Today's Date: 6/9/2022    Health Maintenance         Date Due Completion Date    COVID-19 Vaccine (3 - Booster for Fred series) 05/06/2022 1/6/2022    Shingles Vaccine (1 of 2) 11/11/2022 (Originally 2/14/1954) ---    Pneumococcal Vaccines (Age 65+) (1 - PCV) 11/11/2022 (Originally 2/14/1941) ---    Influenza Vaccine (Season Ended) 09/01/2022 ---    PROSTATE-SPECIFIC ANTIGEN 04/25/2023 4/25/2022    Lipid Panel 07/16/2026 7/16/2021    TETANUS VACCINE 11/12/2031 11/12/2021          No orders of the defined types were placed in this encounter.

## 2022-06-10 PROBLEM — C61 MALIGNANT NEOPLASM OF PROSTATE: Chronic | Status: ACTIVE | Noted: 2022-06-10

## 2022-06-30 DIAGNOSIS — E78.5 HYPERLIPIDEMIA, UNSPECIFIED HYPERLIPIDEMIA TYPE: ICD-10-CM

## 2022-06-30 RX ORDER — ATORVASTATIN CALCIUM 20 MG/1
20 TABLET, FILM COATED ORAL NIGHTLY
Qty: 90 TABLET | Refills: 0 | Status: SHIPPED | OUTPATIENT
Start: 2022-06-30 | End: 2022-09-08 | Stop reason: SDUPTHER

## 2022-09-08 DIAGNOSIS — I10 ESSENTIAL (PRIMARY) HYPERTENSION: ICD-10-CM

## 2022-09-08 DIAGNOSIS — E78.5 HYPERLIPIDEMIA, UNSPECIFIED HYPERLIPIDEMIA TYPE: ICD-10-CM

## 2022-09-08 RX ORDER — LABETALOL 100 MG/1
100 TABLET, FILM COATED ORAL 2 TIMES DAILY
Qty: 14 TABLET | Refills: 0 | Status: SHIPPED | OUTPATIENT
Start: 2022-09-08 | End: 2022-09-12 | Stop reason: SDUPTHER

## 2022-09-08 RX ORDER — AMLODIPINE BESYLATE 5 MG/1
5 TABLET ORAL 2 TIMES DAILY
Qty: 14 TABLET | Refills: 0 | Status: SHIPPED | OUTPATIENT
Start: 2022-09-08 | End: 2022-09-12 | Stop reason: SDUPTHER

## 2022-09-08 RX ORDER — ATORVASTATIN CALCIUM 20 MG/1
20 TABLET, FILM COATED ORAL NIGHTLY
Qty: 7 TABLET | Refills: 0 | Status: SHIPPED | OUTPATIENT
Start: 2022-09-08 | End: 2022-09-12 | Stop reason: SDUPTHER

## 2022-09-12 ENCOUNTER — OFFICE VISIT (OUTPATIENT)
Dept: FAMILY MEDICINE | Facility: CLINIC | Age: 87
End: 2022-09-12
Payer: MEDICARE

## 2022-09-12 VITALS
TEMPERATURE: 97 F | DIASTOLIC BLOOD PRESSURE: 73 MMHG | SYSTOLIC BLOOD PRESSURE: 145 MMHG | HEIGHT: 66 IN | HEART RATE: 51 BPM | BODY MASS INDEX: 21.69 KG/M2 | WEIGHT: 135 LBS | OXYGEN SATURATION: 97 %

## 2022-09-12 DIAGNOSIS — Z13.29 SCREENING FOR HYPOTHYROIDISM: ICD-10-CM

## 2022-09-12 DIAGNOSIS — R51.9 SINUS HEADACHE: ICD-10-CM

## 2022-09-12 DIAGNOSIS — E78.5 HYPERLIPIDEMIA, UNSPECIFIED HYPERLIPIDEMIA TYPE: ICD-10-CM

## 2022-09-12 DIAGNOSIS — Z13.1 SCREENING FOR DIABETES MELLITUS: ICD-10-CM

## 2022-09-12 DIAGNOSIS — C61 MALIGNANT NEOPLASM OF PROSTATE: Chronic | ICD-10-CM

## 2022-09-12 DIAGNOSIS — I10 ESSENTIAL (PRIMARY) HYPERTENSION: Primary | ICD-10-CM

## 2022-09-12 LAB
ANION GAP SERPL CALCULATED.3IONS-SCNC: 8 MMOL/L (ref 7–16)
BASOPHILS # BLD AUTO: 0.03 K/UL (ref 0–0.2)
BASOPHILS NFR BLD AUTO: 0.6 % (ref 0–1)
BUN SERPL-MCNC: 15 MG/DL (ref 7–18)
BUN/CREAT SERPL: 18 (ref 6–20)
CALCIUM SERPL-MCNC: 9.6 MG/DL (ref 8.5–10.1)
CHLORIDE SERPL-SCNC: 110 MMOL/L (ref 98–107)
CHOLEST SERPL-MCNC: 134 MG/DL (ref 0–200)
CHOLEST/HDLC SERPL: 2.1 {RATIO}
CO2 SERPL-SCNC: 28 MMOL/L (ref 21–32)
CREAT SERPL-MCNC: 0.84 MG/DL (ref 0.7–1.3)
DIFFERENTIAL METHOD BLD: ABNORMAL
EGFR (NO RACE VARIABLE) (RUSH/TITUS): 85 ML/MIN/1.73M²
EOSINOPHIL # BLD AUTO: 0.24 K/UL (ref 0–0.5)
EOSINOPHIL NFR BLD AUTO: 5.2 % (ref 1–4)
ERYTHROCYTE [DISTWIDTH] IN BLOOD BY AUTOMATED COUNT: 12.6 % (ref 11.5–14.5)
GLUCOSE SERPL-MCNC: 91 MG/DL (ref 74–106)
HCT VFR BLD AUTO: 39.8 % (ref 40–54)
HDLC SERPL-MCNC: 64 MG/DL (ref 40–60)
HGB BLD-MCNC: 12.5 G/DL (ref 13.5–18)
IMM GRANULOCYTES # BLD AUTO: 0.01 K/UL (ref 0–0.04)
IMM GRANULOCYTES NFR BLD: 0.2 % (ref 0–0.4)
LDLC SERPL CALC-MCNC: 53 MG/DL
LDLC/HDLC SERPL: 0.8 {RATIO}
LYMPHOCYTES # BLD AUTO: 1.59 K/UL (ref 1–4.8)
LYMPHOCYTES NFR BLD AUTO: 34.4 % (ref 27–41)
MCH RBC QN AUTO: 30 PG (ref 27–31)
MCHC RBC AUTO-ENTMCNC: 31.4 G/DL (ref 32–36)
MCV RBC AUTO: 95.4 FL (ref 80–96)
MONOCYTES # BLD AUTO: 0.52 K/UL (ref 0–0.8)
MONOCYTES NFR BLD AUTO: 11.3 % (ref 2–6)
MPC BLD CALC-MCNC: 11.3 FL (ref 9.4–12.4)
NEUTROPHILS # BLD AUTO: 2.23 K/UL (ref 1.8–7.7)
NEUTROPHILS NFR BLD AUTO: 48.3 % (ref 53–65)
NONHDLC SERPL-MCNC: 70 MG/DL
NRBC # BLD AUTO: 0 X10E3/UL
NRBC, AUTO (.00): 0 %
PLATELET # BLD AUTO: 176 K/UL (ref 150–400)
POTASSIUM SERPL-SCNC: 4.1 MMOL/L (ref 3.5–5.1)
RBC # BLD AUTO: 4.17 M/UL (ref 4.6–6.2)
SODIUM SERPL-SCNC: 142 MMOL/L (ref 136–145)
TRIGL SERPL-MCNC: 86 MG/DL (ref 35–150)
VLDLC SERPL-MCNC: 17 MG/DL
WBC # BLD AUTO: 4.62 K/UL (ref 4.5–11)

## 2022-09-12 PROCEDURE — 80061 LIPID PANEL: CPT | Mod: ,,, | Performed by: CLINICAL MEDICAL LABORATORY

## 2022-09-12 PROCEDURE — 85025 COMPLETE CBC W/AUTO DIFF WBC: CPT | Mod: ,,, | Performed by: CLINICAL MEDICAL LABORATORY

## 2022-09-12 PROCEDURE — 85025 CBC WITH DIFFERENTIAL: ICD-10-PCS | Mod: ,,, | Performed by: CLINICAL MEDICAL LABORATORY

## 2022-09-12 PROCEDURE — 80048 BASIC METABOLIC PNL TOTAL CA: CPT | Mod: ,,, | Performed by: CLINICAL MEDICAL LABORATORY

## 2022-09-12 PROCEDURE — 99214 PR OFFICE/OUTPT VISIT, EST, LEVL IV, 30-39 MIN: ICD-10-PCS | Mod: ,,, | Performed by: NURSE PRACTITIONER

## 2022-09-12 PROCEDURE — 80048 BASIC METABOLIC PANEL: ICD-10-PCS | Mod: ,,, | Performed by: CLINICAL MEDICAL LABORATORY

## 2022-09-12 PROCEDURE — 80061 LIPID PANEL: ICD-10-PCS | Mod: ,,, | Performed by: CLINICAL MEDICAL LABORATORY

## 2022-09-12 PROCEDURE — 99214 OFFICE O/P EST MOD 30 MIN: CPT | Mod: ,,, | Performed by: NURSE PRACTITIONER

## 2022-09-12 RX ORDER — FLUTICASONE PROPIONATE 50 MCG
2 SPRAY, SUSPENSION (ML) NASAL DAILY
Qty: 16 G | Refills: 2 | Status: SHIPPED | OUTPATIENT
Start: 2022-09-12 | End: 2022-11-30 | Stop reason: SDUPTHER

## 2022-09-12 RX ORDER — LABETALOL 100 MG/1
100 TABLET, FILM COATED ORAL 2 TIMES DAILY
Qty: 180 TABLET | Refills: 0 | Status: SHIPPED | OUTPATIENT
Start: 2022-09-12 | End: 2022-11-30 | Stop reason: SDUPTHER

## 2022-09-12 RX ORDER — AMLODIPINE BESYLATE 5 MG/1
5 TABLET ORAL 2 TIMES DAILY
Qty: 180 TABLET | Refills: 0 | Status: SHIPPED | OUTPATIENT
Start: 2022-09-12 | End: 2022-11-30 | Stop reason: SDUPTHER

## 2022-09-12 RX ORDER — ATORVASTATIN CALCIUM 20 MG/1
20 TABLET, FILM COATED ORAL NIGHTLY
Qty: 90 TABLET | Refills: 0 | Status: SHIPPED | OUTPATIENT
Start: 2022-09-12 | End: 2022-11-30 | Stop reason: SDUPTHER

## 2022-09-12 NOTE — PATIENT INSTRUCTIONS
Lab obtained in clinic today, we will notify you of results and any necessary changes to plan of care   Refills on routine medications   Flonase nasal spray daily as directed   Follow up in clinic in two week if headaches persist, sooner if any symptoms worsen   Follow up in three months and as needed

## 2022-09-12 NOTE — PROGRESS NOTES
Clinic note     Patient name: Gavin Madsen is a 87 y.o. male   Chief compliant   Chief Complaint   Patient presents with    Medication Refill     No other problems at this time.     Headache     Couple of week, no fever.        Subjective     History of present illness   In clinic for routine follow up and medication refills   Denies any acute concerns at present, reports intermittent headaches over the last two weeks which he r/t sinus   Family is  present during office visit today   Hx of prostate cancer, followed by Dr Chan for urology  Past Medical History: hypertension, hyperlipidemia, prostate cancer         Social History     Tobacco Use    Smoking status: Never    Smokeless tobacco: Never   Substance Use Topics    Alcohol use: Never    Drug use: Never       Review of patient's allergies indicates:   Allergen Reactions    Aspirin        Past Medical History:   Diagnosis Date    Elevated PSA     Hypertension     Malignant neoplasm of prostate        Past Surgical History:   Procedure Laterality Date    BIOPSY WITH TRANSRECTAL ULTRASOUND (TRUS) GUIDANCE Bilateral 04/11/2019    Aylin 4+3=7        Family History   Problem Relation Age of Onset    Hypertension Father     Hypertension Mother     Diabetes Mother     Prostate cancer Brother     Cervical cancer Daughter     Hypertension Daughter     Lupus Son          Current Outpatient Medications:     amLODIPine (NORVASC) 5 MG tablet, Take 1 tablet (5 mg total) by mouth 2 (two) times daily., Disp: 180 tablet, Rfl: 0    atorvastatin (LIPITOR) 20 MG tablet, Take 1 tablet (20 mg total) by mouth every evening., Disp: 90 tablet, Rfl: 0    fluticasone propionate (FLONASE) 50 mcg/actuation nasal spray, 2 sprays (100 mcg total) by Each Nostril route once daily., Disp: 16 g, Rfl: 2    labetaloL (NORMODYNE) 100 MG tablet, Take 1 tablet (100 mg total) by mouth 2 (two) times daily., Disp: 180 tablet, Rfl: 0    Review of Systems   Constitutional:  Negative for activity change,  "appetite change, chills, fatigue, fever and unexpected weight change.   Eyes:  Negative for visual disturbance.   Respiratory:  Negative for cough and shortness of breath.    Cardiovascular:  Negative for chest pain, palpitations and leg swelling.   Gastrointestinal:  Negative for abdominal pain, blood in stool, change in bowel habit, constipation, diarrhea, nausea, vomiting and change in bowel habit.   Endocrine: Negative for polydipsia and polyuria.   Genitourinary:  Negative for difficulty urinating and dysuria.   Musculoskeletal:  Negative for arthralgias, gait problem and myalgias.   Integumentary:  Negative for rash and wound.   Neurological:  Positive for headaches. Negative for dizziness, light-headedness, coordination difficulties and coordination difficulties.   Psychiatric/Behavioral:  Negative for confusion, dysphoric mood and sleep disturbance. The patient is not nervous/anxious.      Objective     BP (!) 145/73 (BP Method: Large (Manual))   Pulse (!) 51   Temp 97.3 °F (36.3 °C)   Ht 5' 6" (1.676 m)   Wt 61.2 kg (135 lb)   SpO2 97%   BMI 21.79 kg/m²     Physical Exam   Constitutional: He is oriented to person, place, and time. No distress.   HENT:   Head: Atraumatic.   Nose: Right sinus exhibits no maxillary sinus tenderness and no frontal sinus tenderness. Left sinus exhibits no maxillary sinus tenderness and no frontal sinus tenderness.   Mouth/Throat: Mucous membranes are moist.   Eyes: Pupils are equal, round, and reactive to light. Conjunctivae are normal.   Cardiovascular: Regular rhythm. Bradycardia present.   Asymptomatic bradycardia  Pulmonary:      Effort: Pulmonary effort is normal. No respiratory distress.      Breath sounds: Normal breath sounds. No wheezing, rhonchi or rales.     Abdominal: Soft. Normal appearance and bowel sounds are normal. He exhibits no distension. There is no abdominal tenderness.   Musculoskeletal:         General: Normal range of motion.      Cervical back: " Neck supple.      Right lower leg: No edema.      Left lower leg: No edema.   Neurological: He is alert and oriented to person, place, and time. Gait normal.   Skin: Skin is warm and dry.   Psychiatric: His behavior is normal. Mood normal.     Lab Results   Component Value Date    WBC 5.23 04/04/2022    HGB 12.0 (L) 04/04/2022    HCT 37.6 (L) 04/04/2022    MCV 94.2 04/04/2022     04/04/2022       CMP  Sodium   Date Value Ref Range Status   04/04/2022 141 136 - 145 mmol/L Final     Potassium   Date Value Ref Range Status   04/04/2022 4.3 3.5 - 5.1 mmol/L Final     Chloride   Date Value Ref Range Status   04/04/2022 110 (H) 98 - 107 mmol/L Final     CO2   Date Value Ref Range Status   04/04/2022 30 21 - 32 mmol/L Final     Glucose   Date Value Ref Range Status   04/04/2022 96 74 - 106 mg/dL Final     BUN   Date Value Ref Range Status   04/04/2022 20 (H) 7 - 18 mg/dL Final     Creatinine   Date Value Ref Range Status   04/04/2022 0.97 0.70 - 1.30 mg/dL Final     Calcium   Date Value Ref Range Status   04/04/2022 9.6 8.5 - 10.1 mg/dL Final     Total Protein   Date Value Ref Range Status   04/04/2022 7.0 6.4 - 8.2 g/dL Final     Albumin   Date Value Ref Range Status   04/04/2022 4.0 3.5 - 5.0 g/dL Final     Bilirubin, Total   Date Value Ref Range Status   04/04/2022 0.6 0.0 - 1.2 mg/dL Final     Alk Phos   Date Value Ref Range Status   04/04/2022 70 45 - 115 U/L Final     AST   Date Value Ref Range Status   04/04/2022 21 15 - 37 U/L Final     ALT   Date Value Ref Range Status   04/04/2022 29 16 - 61 U/L Final     Anion Gap   Date Value Ref Range Status   04/04/2022 5 (L) 7 - 16 mmol/L Final     eGFR    Date Value Ref Range Status   11/12/2021 110 >=60 mL/min/1.73m² Final     eGFR   Date Value Ref Range Status   04/04/2022 78 >=60 mL/min/1.73m² Final     No results found for: TSH  Lab Results   Component Value Date    CHOL 117 07/16/2021     Lab Results   Component Value Date    HDL 63 (H)  07/16/2021     Lab Results   Component Value Date    LDLCALC 38 07/16/2021     Lab Results   Component Value Date    TRIG 78 07/16/2021     Lab Results   Component Value Date    CHOLHDL 1.9 07/16/2021     No results found for: LABA1C, HGBA1C      Assessment and Plan   Essential (primary) hypertension  -     labetaloL (NORMODYNE) 100 MG tablet; Take 1 tablet (100 mg total) by mouth 2 (two) times daily.  Dispense: 180 tablet; Refill: 0  -     amLODIPine (NORVASC) 5 MG tablet; Take 1 tablet (5 mg total) by mouth 2 (two) times daily.  Dispense: 180 tablet; Refill: 0  -     CBC Auto Differential; Future; Expected date: 09/12/2022  -     Basic Metabolic Panel; Future; Expected date: 09/12/2022    Hyperlipidemia, unspecified hyperlipidemia type  -     atorvastatin (LIPITOR) 20 MG tablet; Take 1 tablet (20 mg total) by mouth every evening.  Dispense: 90 tablet; Refill: 0  -     Lipid Panel; Future; Expected date: 09/12/2022    Malignant neoplasm of prostate    Screening for hypothyroidism    Screening for diabetes mellitus    Sinus headache  -     fluticasone propionate (FLONASE) 50 mcg/actuation nasal spray; 2 sprays (100 mcg total) by Each Nostril route once daily.  Dispense: 16 g; Refill: 2        Patient Instructions  Patient Instructions   Lab obtained in clinic today, we will notify you of results and any necessary changes to plan of care   Refills on routine medications   Flonase nasal spray daily as directed   Follow up in clinic in two week if headaches persist, sooner if any symptoms worsen   Follow up in three months and as needed     I, Dr. Tigre Demarco M.D., have reviewed the encounter note for this visit and agree with the assessment and plan as put forth by the Nurse Practitioner.

## 2022-10-09 DIAGNOSIS — Z71.89 COMPLEX CARE COORDINATION: ICD-10-CM

## 2022-10-25 ENCOUNTER — OFFICE VISIT (OUTPATIENT)
Dept: UROLOGY | Facility: CLINIC | Age: 87
End: 2022-10-25
Payer: MEDICARE

## 2022-10-25 VITALS
HEIGHT: 70 IN | WEIGHT: 138 LBS | BODY MASS INDEX: 19.76 KG/M2 | HEART RATE: 62 BPM | DIASTOLIC BLOOD PRESSURE: 85 MMHG | SYSTOLIC BLOOD PRESSURE: 179 MMHG

## 2022-10-25 DIAGNOSIS — R97.20 ELEVATED PSA: ICD-10-CM

## 2022-10-25 DIAGNOSIS — C61 MALIGNANT NEOPLASM OF PROSTATE: Primary | ICD-10-CM

## 2022-10-25 DIAGNOSIS — N41.1 CHRONIC PROSTATITIS: ICD-10-CM

## 2022-10-25 DIAGNOSIS — N32.0 BLADDER OUTLET OBSTRUCTION: ICD-10-CM

## 2022-10-25 PROCEDURE — 99213 OFFICE O/P EST LOW 20 MIN: CPT | Mod: S$PBB,,, | Performed by: UROLOGY

## 2022-10-25 PROCEDURE — 99213 PR OFFICE/OUTPT VISIT, EST, LEVL III, 20-29 MIN: ICD-10-PCS | Mod: S$PBB,,, | Performed by: UROLOGY

## 2022-10-25 PROCEDURE — 99214 OFFICE O/P EST MOD 30 MIN: CPT | Mod: PBBFAC | Performed by: UROLOGY

## 2022-10-25 NOTE — PATIENT INSTRUCTIONS
Patient doing okay clinically.  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  PSA returned after patient left the office and is up only minimally to 20.000.  Patient's daughter Jadyn was notified of results by telephone.  Six month appointment with PSA.

## 2022-10-25 NOTE — PROGRESS NOTES
Subjective:       Patient ID: Treasure Madsen is a 87 y.o. male.    Chief Complaint: Follow-up (6 month visit, PSA C61)    Prostate Cancer Follow-up  2019 17:57  Test comment: right and left prostate bx's      SURGICAL PATHOLOGY REPORT      PATIENT: TREASURE MADSEN CASE NUMBER: [id number]    1935 AGE: 84 yrs SEX: M ACCOUNT NUMBER: [id number]   RACE: Black UNIT NUMBER: [id number]   ATTENDING DATE COLLECTED: 2019   Irvin Chan M.D.   PHYSICIAN:   DATE RECEIVED: 2019   DATE REPORTED: 2019         DIAGNOSIS:   A. 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 Prostate, left midline, biopsies:   - PROSTATIC ADENOCARCINOMA, BRYAN SCORE 3+4=7, INVOLVING   APPROXIMATELY 15% OF SUBMITTED TISSUE   - High-grade prostatic intraepithelial neoplasia (HGPIN)   C. Prostate, right lateral, biopsy:   - Prostate tissue with a small focus of atypical glands   - High-grade prostatic intraepithelial neoplasia (HGPIN)   D. Prostate, right midline, biopsies:   - High-grade prostatic intraepithelial neoplasia (HGPIN)   - Chronic inflammation      COMMENTS: The diagnosis of carcinoma is supported by the failure of immunohistochemistry for high molecular weight cytokeratin to demonstrate basal cells in the atypical   glands (performed on parts A, B, C, and D). The atypical glands in part C appear to lack basal cells and are suspicious for malignancy, but do not meet the   cytological and architectural threshold for a diagnosis of malignancy. Dr. Gonzales has reviewed selected slides and agrees with the interpretation.      Of note, this diagnosis places this patient in prognostic grade group 2 (out of 5) of a newly proposed consensus grading scheme (see reference below).      Reference: Diego et al. The 2014 International Society of Urological Pathology (ISUP)  Consensus Conference   on [place] Grading of Prostatic Carcinoma: Definition of Grading Patterns and Proposal for a New Grading System. Am J Surg Pathol. 2016 Feb;40(2):244-52.      SPECIMEN SUBMITTED:   A. Bxs left lateral prostate   B. Bxs left midline prostate   C. Bxs right lateral prostate   D. Bxs right midline prostate      GROSS DESCRIPTION:   A. The specimen is received in formalin and consists of four tan stringy biopsy fragments ranging in length from 0.4 to 1.8 cm entirely submitted in block A1.   B. The specimen is received in formalin and consists of three tan stringy fragments ranging in length from 1.4 to 3.2 cm in length with the longest divided. All submitted in block B1.   C. The specimen is received in formalin and consists of three tan stringy biopsy fragments ranging in length from 0.9 to 1.0 cm.   Additionally there are four tan fragments each measuring approximately 0.1 cm. All submitted in block C1.   D. The specimen is received in formalin and consists of three tan stringy biopsy fragments ranging in length from 1.3 to 1.7 cm entirely submitted in block D1. CAC/afl      MICROSCOPIC DESCRIPTION:   A microscopic examination has been performed.      Clinical History: Elevated PSA, PSA 10.800      Electronically Signed By:      Aaron Early MD   04/12/2019 14:32  ---------------------------     Urology History  Associated Symptoms:  Edema  Mr. Madsen is 84 years old. He was sent by Ms. Mc because of PSA elevation. He had a very high normal PSA in 2012 but none between then and last year. Patient has mild  bladder outlet obstructive symptoms with IPSS score of 2, and does not feel he needs any help with the way he is voiding. Nocturia x2. No family history of prostate cancer. No history of urinary tract infections, hematuria, or any other previous  problems. Patient in with his daughter for follow-up of PSA elevation.  (February 7, 2019)     Patient returned for follow-up of PSA  elevation to have another PSA drawn and for consideration of prostate biopsies. Patient in with his 2 daughters today. Stable with voiding without significant lower tract obstructive symptoms.  (April 11, 2019)     Mr. Madsen is in for first visit since his biopsies were made. I had recommended that he be managed conservatively because of his age. He was found to have prostate cancer on the biopsies and we had talked with his daughter about situation. Patient is doing well clinically and feels just as well as he did last time he was here when he had his biopsies.  He did not have his PSA drawn before he was seen. Patient in with his daughter, Kelley. (October 14, 2019)     Mr. Narayan has not been seen in nearly 1 year. He was in again today with his daughter Kelley. Patient feels he is voiding okay. No burning on urination and no visible blood in the urine. He has not had any hospitalizations or major health problems since he was last seen in 11 months ago. Last appointment delayed because of the coronavirus problem.  (September 16, 2020)     PSA Results:  Past PSA Results   PSA was 14.400 on April 19, 2021  PSA  was 17.000 on 9/16/2020  PSA was 12.300 on October 14, 2019  PSA was 10.800 on April 11, 2019  PSA was 9.760 on 11/16/2018  PSA was 11.300 on 8/15/2018  PSA was 3.86 on October 9, 2012  --------------------------------------------------------------------------------------------------------------------------------------------------------------------------------------------------------------------------------------------------------------------------------------------------------------------------PSA was 16.500 on October 20, 2021  PSA was 19.800 on April 25, 2022  PSA was 20.000 on October 25, 2022     All the above notes are from the old clinic system except for the PSA done today which is down slightly from last time.  Patient doing okay clinically.  Nocturia x2 with no worsening bladder outlet  obstructive symptoms.  He has actually gained 6 lb since last visit.  Patient in with his daughter. (April 19, 2021)     Mr. Madsen is in for 6 month follow-up of prostate cancer.  He had his PSA done and results are pending.  No new health issues that he is aware of.  8 lb weight loss since last visit.  he had gained 6 pounds last time.  Patient in with his daughter Jadyn.  (October 20, 2021)     Mr. Narayan is in for his 6 month follow-up for prostate cancer.  PSA has been drawn and is pending.  No worsening bladder outlet obstructive symptoms or any other  problems.  He had to have a trip to the emergency room in November because of a fall injuring his head but otherwise has had a good 6 months.  PSA pending.  (April 25, 2022)    Mr. Madsen is in for his 6 month follow-up of prostate cancer.  He has had a good 6 months in general without any worsening bladder outlet obstructive symptoms or any other health problems.  Continues to have nocturia 1-2 times nightly.  He has had no additional falls or any other problems.  Overall good 6 months.  PSA drawn and results pending.  [October 25, 2022]        Review of Systems      Objective:      Physical Exam  Constitutional:       Appearance: Normal appearance. He is normal weight.   Genitourinary:     Prostate: Normal.      Rectum: Normal.      Comments: Prostate gland 20-25 grams smooth firm and symmetrical  Neurological:      Mental Status: He is alert.   Psychiatric:         Mood and Affect: Mood normal.         Behavior: Behavior normal.     Urinalysis revealed occasional pus cells and rare red cells.  The dipstick had a trace of blood with pH 7.5 and specific gravity 1.010  Assessment:       Problem List Items Addressed This Visit          Oncology    Malignant neoplasm of prostate - Primary (Chronic)    Relevant Orders    PSA, Total (Diagnostic)     Other Visit Diagnoses       Elevated PSA        Chronic prostatitis        Bladder outlet obstruction                 Plan:     Patient doing okay clinically.  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  PSA returned after patient left the office and is up only minimally to 20.000.  Patient's daughter Jadyn was notified of results by telephone.  Six month appointment with PSA.

## 2022-11-30 ENCOUNTER — OFFICE VISIT (OUTPATIENT)
Dept: FAMILY MEDICINE | Facility: CLINIC | Age: 87
End: 2022-11-30
Payer: MEDICARE

## 2022-11-30 VITALS
HEIGHT: 70 IN | HEART RATE: 54 BPM | SYSTOLIC BLOOD PRESSURE: 156 MMHG | OXYGEN SATURATION: 97 % | WEIGHT: 139 LBS | DIASTOLIC BLOOD PRESSURE: 68 MMHG | TEMPERATURE: 98 F | BODY MASS INDEX: 19.9 KG/M2

## 2022-11-30 DIAGNOSIS — M79.671 BILATERAL FOOT PAIN: ICD-10-CM

## 2022-11-30 DIAGNOSIS — J30.2 SEASONAL ALLERGIES: ICD-10-CM

## 2022-11-30 DIAGNOSIS — I10 ESSENTIAL (PRIMARY) HYPERTENSION: ICD-10-CM

## 2022-11-30 DIAGNOSIS — M79.672 BILATERAL FOOT PAIN: ICD-10-CM

## 2022-11-30 DIAGNOSIS — L84 CORNS AND CALLUS: Primary | ICD-10-CM

## 2022-11-30 DIAGNOSIS — E78.5 HYPERLIPIDEMIA, UNSPECIFIED HYPERLIPIDEMIA TYPE: ICD-10-CM

## 2022-11-30 DIAGNOSIS — C61 MALIGNANT NEOPLASM OF PROSTATE: Chronic | ICD-10-CM

## 2022-11-30 PROCEDURE — 99213 PR OFFICE/OUTPT VISIT, EST, LEVL III, 20-29 MIN: ICD-10-PCS | Mod: ,,, | Performed by: NURSE PRACTITIONER

## 2022-11-30 PROCEDURE — 99213 OFFICE O/P EST LOW 20 MIN: CPT | Mod: ,,, | Performed by: NURSE PRACTITIONER

## 2022-11-30 RX ORDER — AMLODIPINE BESYLATE 5 MG/1
5 TABLET ORAL 2 TIMES DAILY
Qty: 180 TABLET | Refills: 0 | Status: SHIPPED | OUTPATIENT
Start: 2022-11-30 | End: 2023-04-05 | Stop reason: SDUPTHER

## 2022-11-30 RX ORDER — FLUTICASONE PROPIONATE 50 MCG
2 SPRAY, SUSPENSION (ML) NASAL DAILY
Qty: 16 G | Refills: 2 | Status: SHIPPED | OUTPATIENT
Start: 2022-11-30 | End: 2023-06-15

## 2022-11-30 RX ORDER — ATORVASTATIN CALCIUM 20 MG/1
20 TABLET, FILM COATED ORAL NIGHTLY
Qty: 90 TABLET | Refills: 0 | Status: SHIPPED | OUTPATIENT
Start: 2022-11-30 | End: 2023-04-05

## 2022-11-30 RX ORDER — LABETALOL 100 MG/1
100 TABLET, FILM COATED ORAL 2 TIMES DAILY
Qty: 180 TABLET | Refills: 0 | Status: SHIPPED | OUTPATIENT
Start: 2022-11-30 | End: 2023-03-16 | Stop reason: SDUPTHER

## 2022-11-30 NOTE — PATIENT INSTRUCTIONS
Soak bilateral feet in warm soapy water ( use mild soap like baby shampoo)  pat dry, gently and briefly use pumice stone to callus areas, apply  Moisturizer  (something similar to Eucerin, Aquaphor, Cerave)   Wear corn pads at callus sites to avoid pressure   Wear good fitting shoes and socks, avoid shoes that rub or socks that will wrinkle     Refills on routine medications

## 2022-11-30 NOTE — PROGRESS NOTES
Clinic note     Patient name: Gavin Madsen is a 87 y.o. male   Chief compliant   Chief Complaint   Patient presents with    Foot Pain     C/o of both feet hurting. Has a appointment in jan with dr. aguero but states the pain is getting worse. States he has hard places but no sores.        Subjective     History of present illness   In clinic for evaluation of bilateral foot pain with ambulation, denies any known injury; has callus to bilateral feet  Has appointment with Dr Aguero as new patient in January, staff contacted Dr Aguero's office there is no earlier appointment available, offered referral [facility] in [place] with appointment mid December, pt declines referral to [place]   Request refills on routine medications while in clinic   Family present during office visit today   Past Medical History: HTN, prostate cancer         Social History     Tobacco Use    Smoking status: Never    Smokeless tobacco: Never   Substance Use Topics    Alcohol use: Never    Drug use: Never       Review of patient's allergies indicates:   Allergen Reactions    Aspirin        Past Medical History:   Diagnosis Date    Elevated PSA     Hypertension     Malignant neoplasm of prostate        Past Surgical History:   Procedure Laterality Date    BIOPSY WITH TRANSRECTAL ULTRASOUND (TRUS) GUIDANCE Bilateral 04/11/2019    [place] 4+3=7        Family History   Problem Relation Age of Onset    Hypertension Father     Hypertension Mother     Diabetes Mother     Prostate cancer Brother     Cervical cancer Daughter     Hypertension Daughter     Lupus Son          Current Outpatient Medications:     amLODIPine (NORVASC) 5 MG tablet, Take 1 tablet (5 mg total) by mouth 2 (two) times daily., Disp: 180 tablet, Rfl: 0    atorvastatin (LIPITOR) 20 MG tablet, Take 1 tablet (20 mg total) by mouth every evening., Disp: 90 tablet, Rfl: 0    fluticasone propionate (FLONASE) 50 mcg/actuation nasal spray, 2 sprays (100 mcg total) by Each Nostril  "route once daily., Disp: 16 g, Rfl: 2    labetaloL (NORMODYNE) 100 MG tablet, Take 1 tablet (100 mg total) by mouth 2 (two) times daily., Disp: 180 tablet, Rfl: 0    Review of Systems   Constitutional:  Negative for activity change, appetite change, chills, fatigue, fever and unexpected weight change.   Respiratory:  Negative for cough and shortness of breath.    Cardiovascular:  Negative for chest pain, palpitations and leg swelling.   Gastrointestinal:  Negative for abdominal pain, blood in stool, change in bowel habit, constipation, diarrhea, nausea, vomiting and change in bowel habit.   Genitourinary:  Negative for difficulty urinating and dysuria.   Musculoskeletal:  Positive for arthralgias. Negative for gait problem and myalgias.   Integumentary:  Negative for rash and wound.        Painful corn and callus bilateral feet    Neurological:  Negative for dizziness, light-headedness, headaches, coordination difficulties and coordination difficulties.   Psychiatric/Behavioral:  Negative for confusion, dysphoric mood and sleep disturbance. The patient is not nervous/anxious.      Objective     BP (!) 156/68   Pulse (!) 54   Temp 98.3 °F (36.8 °C)   Ht 5' 10" (1.778 m)   Wt 63 kg (139 lb)   SpO2 97%   BMI 19.94 kg/m²     Physical Exam   Constitutional: He is oriented to person, place, and time. normal appearance. No distress.   HENT:   Head: Atraumatic.   Mouth/Throat: Mucous membranes are moist.   Eyes: Pupils are equal, round, and reactive to light. Conjunctivae are normal.   Cardiovascular: Regular rhythm. Bradycardia present. Pulmonary:      Effort: Pulmonary effort is normal. No respiratory distress.      Breath sounds: Normal breath sounds. No wheezing, rhonchi or rales.     Abdominal: Soft. Normal appearance and bowel sounds are normal. He exhibits no distension. There is no abdominal tenderness.   Musculoskeletal:         General: Normal range of motion.      Cervical back: Neck supple.      Right " lower leg: No edema.      Left lower leg: No edema.        Feet:    Feet:   Right Foot:   Skin Integrity: Positive for callus and dry skin. Negative for ulcer, skin breakdown or erythema.   Left Foot:   Skin Integrity: Positive for callus and dry skin. Negative for ulcer, skin breakdown or erythema.   Neurological: He is alert and oriented to person, place, and time. Gait normal.   Skin: Skin is warm and dry.   Psychiatric: His behavior is normal. Mood normal.   Toenails to bilateral feet are long, thickened and discolored       Lab Results   Component Value Date    WBC 4.62 09/12/2022    HGB 12.5 (L) 09/12/2022    HCT 39.8 (L) 09/12/2022    MCV 95.4 09/12/2022     09/12/2022       CMP  Sodium   Date Value Ref Range Status   09/12/2022 142 136 - 145 mmol/L Final     Potassium   Date Value Ref Range Status   09/12/2022 4.1 3.5 - 5.1 mmol/L Final     Chloride   Date Value Ref Range Status   09/12/2022 110 (H) 98 - 107 mmol/L Final     CO2   Date Value Ref Range Status   09/12/2022 28 21 - 32 mmol/L Final     Glucose   Date Value Ref Range Status   09/12/2022 91 74 - 106 mg/dL Final     BUN   Date Value Ref Range Status   09/12/2022 15 7 - 18 mg/dL Final     Creatinine   Date Value Ref Range Status   09/12/2022 0.84 0.70 - 1.30 mg/dL Final     Calcium   Date Value Ref Range Status   09/12/2022 9.6 8.5 - 10.1 mg/dL Final     Total Protein   Date Value Ref Range Status   04/04/2022 7.0 6.4 - 8.2 g/dL Final     Albumin   Date Value Ref Range Status   04/04/2022 4.0 3.5 - 5.0 g/dL Final     Bilirubin, Total   Date Value Ref Range Status   04/04/2022 0.6 0.0 - 1.2 mg/dL Final     Alk Phos   Date Value Ref Range Status   04/04/2022 70 45 - 115 U/L Final     AST   Date Value Ref Range Status   04/04/2022 21 15 - 37 U/L Final     ALT   Date Value Ref Range Status   04/04/2022 29 16 - 61 U/L Final     Anion Gap   Date Value Ref Range Status   09/12/2022 8 7 - 16 mmol/L Final     eGFR    Date Value Ref  Range Status   11/12/2021 110 >=60 mL/min/1.73m² Final     eGFR   Date Value Ref Range Status   04/04/2022 78 >=60 mL/min/1.73m² Final     No results found for: TSH  Lab Results   Component Value Date    CHOL 134 09/12/2022    CHOL 117 07/16/2021     Lab Results   Component Value Date    HDL 64 (H) 09/12/2022    HDL 63 (H) 07/16/2021     Lab Results   Component Value Date    LDLCALC 53 09/12/2022    LDLCALC 38 07/16/2021     Lab Results   Component Value Date    TRIG 86 09/12/2022    TRIG 78 07/16/2021     Lab Results   Component Value Date    CHOLHDL 2.1 09/12/2022    CHOLHDL 1.9 07/16/2021     No results found for: LABA1C, HGBA1C      Assessment and Plan   Corns and callus    Bilateral foot pain    Essential (primary) hypertension  -     amLODIPine (NORVASC) 5 MG tablet; Take 1 tablet (5 mg total) by mouth 2 (two) times daily.  Dispense: 180 tablet; Refill: 0  -     labetaloL (NORMODYNE) 100 MG tablet; Take 1 tablet (100 mg total) by mouth 2 (two) times daily.  Dispense: 180 tablet; Refill: 0    Hyperlipidemia, unspecified hyperlipidemia type  -     atorvastatin (LIPITOR) 20 MG tablet; Take 1 tablet (20 mg total) by mouth every evening.  Dispense: 90 tablet; Refill: 0    Malignant neoplasm of prostate    Seasonal allergies  -     fluticasone propionate (FLONASE) 50 mcg/actuation nasal spray; 2 sprays (100 mcg total) by Each Nostril route once daily.  Dispense: 16 g; Refill: 2          Patient Instructions  Patient Instructions   Soak bilateral feet in warm soapy water ( use mild soap like baby shampoo)  pat dry, gently and briefly use pumice stone to callus areas, apply  Moisturizer  (something similar to Eucerin, Aquaphor, Cerave)   Wear corn pads at callus sites to avoid pressure     Refills on routine medications

## 2023-01-30 ENCOUNTER — OFFICE VISIT (OUTPATIENT)
Dept: FAMILY MEDICINE | Facility: CLINIC | Age: 88
End: 2023-01-30
Payer: MEDICARE

## 2023-01-30 VITALS
DIASTOLIC BLOOD PRESSURE: 67 MMHG | SYSTOLIC BLOOD PRESSURE: 136 MMHG | WEIGHT: 139 LBS | TEMPERATURE: 97 F | BODY MASS INDEX: 19.9 KG/M2 | HEIGHT: 70 IN | HEART RATE: 60 BPM | OXYGEN SATURATION: 96 %

## 2023-01-30 DIAGNOSIS — Z20.822 SUSPECTED 2019 NOVEL CORONAVIRUS INFECTION: ICD-10-CM

## 2023-01-30 DIAGNOSIS — J06.9 UPPER RESPIRATORY TRACT INFECTION, UNSPECIFIED TYPE: Primary | ICD-10-CM

## 2023-01-30 DIAGNOSIS — R05.9 COUGH, UNSPECIFIED TYPE: ICD-10-CM

## 2023-01-30 DIAGNOSIS — Z20.822 COUGH WITH EXPOSURE TO SEVERE ACUTE RESPIRATORY SYNDROME CORONAVIRUS 2 (SARS-COV-2): ICD-10-CM

## 2023-01-30 DIAGNOSIS — R05.8 COUGH WITH EXPOSURE TO SEVERE ACUTE RESPIRATORY SYNDROME CORONAVIRUS 2 (SARS-COV-2): ICD-10-CM

## 2023-01-30 DIAGNOSIS — I10 ESSENTIAL (PRIMARY) HYPERTENSION: ICD-10-CM

## 2023-01-30 LAB
CTP QC/QA: YES
FLUAV AG NPH QL: NEGATIVE
FLUBV AG NPH QL: NEGATIVE
SARS-COV-2 AG RESP QL IA.RAPID: NEGATIVE

## 2023-01-30 PROCEDURE — 87428 SARSCOV & INF VIR A&B AG IA: CPT | Mod: RHCUB | Performed by: NURSE PRACTITIONER

## 2023-01-30 PROCEDURE — 99213 OFFICE O/P EST LOW 20 MIN: CPT | Mod: ,,, | Performed by: NURSE PRACTITIONER

## 2023-01-30 PROCEDURE — 99213 PR OFFICE/OUTPT VISIT, EST, LEVL III, 20-29 MIN: ICD-10-PCS | Mod: ,,, | Performed by: NURSE PRACTITIONER

## 2023-01-30 RX ORDER — AZITHROMYCIN 250 MG/1
TABLET, FILM COATED ORAL
Qty: 6 TABLET | Refills: 0 | Status: SHIPPED | OUTPATIENT
Start: 2023-01-30 | End: 2023-02-04

## 2023-01-30 RX ORDER — GUAIFENESIN 600 MG/1
600 TABLET, EXTENDED RELEASE ORAL 2 TIMES DAILY
Qty: 20 TABLET | Refills: 0 | Status: SHIPPED | OUTPATIENT
Start: 2023-01-30 | End: 2023-02-09

## 2023-01-30 NOTE — PATIENT INSTRUCTIONS
Stop over the counter cold medication-- sudogest and chlorpheniramine maleate    Zithromax and guaifenesin as prescribed   Increase fluid/water intake   Follow up in 5-7 days if symptoms persist, sooner if symptoms worsen

## 2023-01-30 NOTE — PROGRESS NOTES
Clinic note     Patient name: Gavin Madsen is a 87 y.o. male   Chief compliant   Chief Complaint   Patient presents with    Cough     States started Friday. Cough, sinus congestion, HA. No other symptoms voiced. No fever reported.  Has been taking otc sudogest and chlorpheniramine maleate allergy tabs.        Subjective     History of present illness   In clinic for evaluation of sinus congestion and cough which started four days ago  Denies any fever or body aches, denies any known sick exposure   Past Medical History: HTN, prostate cancer, hyperlipidemia          Social History     Tobacco Use    Smoking status: Never    Smokeless tobacco: Never   Substance Use Topics    Alcohol use: Never    Drug use: Never       Review of patient's allergies indicates:   Allergen Reactions    Aspirin        Past Medical History:   Diagnosis Date    Elevated PSA     Hypertension     Malignant neoplasm of prostate        Past Surgical History:   Procedure Laterality Date    BIOPSY WITH TRANSRECTAL ULTRASOUND (TRUS) GUIDANCE Bilateral 04/11/2019    [place] 4+3=7        Family History   Problem Relation Age of Onset    Hypertension Father     Hypertension Mother     Diabetes Mother     Prostate cancer Brother     Cervical cancer Daughter     Hypertension Daughter     Lupus Son          Current Outpatient Medications:     amLODIPine (NORVASC) 5 MG tablet, Take 1 tablet (5 mg total) by mouth 2 (two) times daily., Disp: 180 tablet, Rfl: 0    atorvastatin (LIPITOR) 20 MG tablet, Take 1 tablet (20 mg total) by mouth every evening., Disp: 90 tablet, Rfl: 0    labetaloL (NORMODYNE) 100 MG tablet, Take 1 tablet (100 mg total) by mouth 2 (two) times daily., Disp: 180 tablet, Rfl: 0    azithromycin (Z-BERTRAM) 250 MG tablet, Take 2 tablets by mouth on day 1; Take 1 tablet by mouth on days 2-5, Disp: 6 tablet, Rfl: 0    fluticasone propionate (FLONASE) 50 mcg/actuation nasal spray, 2 sprays (100 mcg total) by Each Nostril route once daily., Disp:  "16 g, Rfl: 2    guaiFENesin (MUCINEX) 600 mg 12 hr tablet, Take 1 tablet (600 mg total) by mouth 2 (two) times daily. for 10 days, Disp: 20 tablet, Rfl: 0    Review of Systems   Constitutional:  Negative for chills and fever.   HENT:  Positive for nasal congestion and sinus pressure/congestion. Negative for sore throat.    Respiratory:  Positive for cough. Negative for shortness of breath.    Cardiovascular:  Negative for chest pain.   Gastrointestinal:  Negative for diarrhea, nausea and vomiting.   Musculoskeletal:  Negative for myalgias.   Neurological:  Negative for headaches.     Objective     /67   Pulse 60   Temp 97.3 °F (36.3 °C)   Ht 5' 10" (1.778 m)   Wt 63 kg (139 lb)   SpO2 96%   BMI 19.94 kg/m²     Physical Exam   Constitutional: He is oriented to person, place, and time. normal appearance. No distress.   HENT:   Head: Atraumatic.   Nose: Mucosal edema present. Right sinus exhibits no maxillary sinus tenderness and no frontal sinus tenderness. Left sinus exhibits no maxillary sinus tenderness and no frontal sinus tenderness.   Mouth/Throat: Mucous membranes are moist. Oropharyngeal erythema present. No oropharyngeal exudate.   Eyes: Pupils are equal, round, and reactive to light. Conjunctivae are normal.   Cardiovascular: Regular rhythm. Bradycardia present. Pulmonary:      Effort: Pulmonary effort is normal. No respiratory distress.      Breath sounds: Normal breath sounds. No wheezing, rhonchi or rales.     Abdominal: Soft. Normal appearance. There is no abdominal tenderness.   Musculoskeletal:         General: Normal range of motion.      Cervical back: Neck supple.      Right lower leg: No edema.      Left lower leg: No edema.   Neurological: He is alert and oriented to person, place, and time. Gait normal.   Skin: Skin is warm and dry.   Psychiatric: His behavior is normal. Mood normal.   Component Ref Range & Units 16:47    SARS Coronavirus 2 Antigen Negative Negative    Rapid Influenza " A Ag Negative Negative    Rapid Influenza B Ag Negative Negative     Acceptable  Yes               Specimen Collected: 01/30/23 16:47           Lab Results   Component Value Date    WBC 4.62 09/12/2022    HGB 12.5 (L) 09/12/2022    HCT 39.8 (L) 09/12/2022    MCV 95.4 09/12/2022     09/12/2022       CMP  Sodium   Date Value Ref Range Status   09/12/2022 142 136 - 145 mmol/L Final     Potassium   Date Value Ref Range Status   09/12/2022 4.1 3.5 - 5.1 mmol/L Final     Chloride   Date Value Ref Range Status   09/12/2022 110 (H) 98 - 107 mmol/L Final     CO2   Date Value Ref Range Status   09/12/2022 28 21 - 32 mmol/L Final     Glucose   Date Value Ref Range Status   09/12/2022 91 74 - 106 mg/dL Final     BUN   Date Value Ref Range Status   09/12/2022 15 7 - 18 mg/dL Final     Creatinine   Date Value Ref Range Status   09/12/2022 0.84 0.70 - 1.30 mg/dL Final     Calcium   Date Value Ref Range Status   09/12/2022 9.6 8.5 - 10.1 mg/dL Final     Total Protein   Date Value Ref Range Status   04/04/2022 7.0 6.4 - 8.2 g/dL Final     Albumin   Date Value Ref Range Status   04/04/2022 4.0 3.5 - 5.0 g/dL Final     Bilirubin, Total   Date Value Ref Range Status   04/04/2022 0.6 0.0 - 1.2 mg/dL Final     Alk Phos   Date Value Ref Range Status   04/04/2022 70 45 - 115 U/L Final     AST   Date Value Ref Range Status   04/04/2022 21 15 - 37 U/L Final     ALT   Date Value Ref Range Status   04/04/2022 29 16 - 61 U/L Final     Anion Gap   Date Value Ref Range Status   09/12/2022 8 7 - 16 mmol/L Final     eGFR    Date Value Ref Range Status   11/12/2021 110 >=60 mL/min/1.73m² Final     eGFR   Date Value Ref Range Status   04/04/2022 78 >=60 mL/min/1.73m² Final     No results found for: TSH  Lab Results   Component Value Date    CHOL 134 09/12/2022    CHOL 117 07/16/2021     Lab Results   Component Value Date    HDL 64 (H) 09/12/2022    HDL 63 (H) 07/16/2021     Lab Results   Component Value Date     LDLCALC 53 09/12/2022    LDLCALC 38 07/16/2021     Lab Results   Component Value Date    TRIG 86 09/12/2022    TRIG 78 07/16/2021     Lab Results   Component Value Date    CHOLHDL 2.1 09/12/2022    CHOLHDL 1.9 07/16/2021     No results found for: LABA1C, HGBA1C      Assessment and Plan   Upper respiratory tract infection, unspecified type  -     azithromycin (Z-BERTRAM) 250 MG tablet; Take 2 tablets by mouth on day 1; Take 1 tablet by mouth on days 2-5  Dispense: 6 tablet; Refill: 0  -     guaiFENesin (MUCINEX) 600 mg 12 hr tablet; Take 1 tablet (600 mg total) by mouth 2 (two) times daily. for 10 days  Dispense: 20 tablet; Refill: 0    Cough, unspecified type  -     POCT SARS-COV2 (COVID) with Flu Antigen    Cough with exposure to severe acute respiratory syndrome coronavirus 2 (SARS-CoV-2)    Suspected 2019 novel coronavirus infection  -     POCT SARS-COV2 (COVID) with Flu Antigen    Essential (primary) hypertension          Patient Instructions  Patient Instructions   Stop over the counter cold medication-- sudogest and chlorpheniramine maleate    Zithromax and guaifenesin as prescribed   Increase fluid/water intake   Follow up in 5-7 days if symptoms persist, sooner if symptoms worsen

## 2023-03-16 DIAGNOSIS — I10 ESSENTIAL (PRIMARY) HYPERTENSION: ICD-10-CM

## 2023-03-16 RX ORDER — LABETALOL 100 MG/1
100 TABLET, FILM COATED ORAL 2 TIMES DAILY
Qty: 180 TABLET | Refills: 0 | Status: SHIPPED | OUTPATIENT
Start: 2023-03-16 | End: 2023-06-15 | Stop reason: SDUPTHER

## 2023-04-25 ENCOUNTER — OFFICE VISIT (OUTPATIENT)
Dept: UROLOGY | Facility: CLINIC | Age: 88
End: 2023-04-25
Payer: MEDICARE

## 2023-04-25 VITALS
WEIGHT: 136 LBS | DIASTOLIC BLOOD PRESSURE: 79 MMHG | SYSTOLIC BLOOD PRESSURE: 167 MMHG | BODY MASS INDEX: 19.47 KG/M2 | HEIGHT: 70 IN | HEART RATE: 60 BPM

## 2023-04-25 DIAGNOSIS — I10 ESSENTIAL HYPERTENSION: ICD-10-CM

## 2023-04-25 DIAGNOSIS — C61 MALIGNANT NEOPLASM OF PROSTATE: Primary | ICD-10-CM

## 2023-04-25 DIAGNOSIS — R97.20 ELEVATED PSA: ICD-10-CM

## 2023-04-25 DIAGNOSIS — N41.1 CHRONIC PROSTATITIS: ICD-10-CM

## 2023-04-25 DIAGNOSIS — N32.0 BLADDER OUTLET OBSTRUCTION: ICD-10-CM

## 2023-04-25 PROCEDURE — 99213 OFFICE O/P EST LOW 20 MIN: CPT | Mod: S$PBB,,, | Performed by: UROLOGY

## 2023-04-25 PROCEDURE — 99213 PR OFFICE/OUTPT VISIT, EST, LEVL III, 20-29 MIN: ICD-10-PCS | Mod: S$PBB,,, | Performed by: UROLOGY

## 2023-04-25 PROCEDURE — 99214 OFFICE O/P EST MOD 30 MIN: CPT | Mod: PBBFAC | Performed by: UROLOGY

## 2023-04-25 NOTE — PROGRESS NOTES
Subjective     Patient ID: Treasure Madsen is a 88 y.o. male.    Chief Complaint: Follow-up (6 month visit, PSA C61)    Prostate Cancer Follow-up  2019 17:57  Test comment: right and left prostate bx's      SURGICAL PATHOLOGY REPORT      PATIENT: TREASURE MADSEN CASE NUMBER: [id number]    1935 AGE: 84 yrs SEX: M ACCOUNT NUMBER: [id number]   RACE: Black UNIT NUMBER: [id number]   ATTENDING DATE COLLECTED: 2019   Irvin Chan M.D.   PHYSICIAN:   DATE RECEIVED: 2019   DATE REPORTED: 2019         DIAGNOSIS:   A. 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 Prostate, left midline, biopsies:   - PROSTATIC ADENOCARCINOMA, BRYAN SCORE 3+4=7, INVOLVING   APPROXIMATELY 15% OF SUBMITTED TISSUE   - High-grade prostatic intraepithelial neoplasia (HGPIN)   C. Prostate, right lateral, biopsy:   - Prostate tissue with a small focus of atypical glands   - High-grade prostatic intraepithelial neoplasia (HGPIN)   D. Prostate, right midline, biopsies:   - High-grade prostatic intraepithelial neoplasia (HGPIN)   - Chronic inflammation      COMMENTS: The diagnosis of carcinoma is supported by the failure of immunohistochemistry for high molecular weight cytokeratin to demonstrate basal cells in the atypical   glands (performed on parts A, B, C, and D). The atypical glands in part C appear to lack basal cells and are suspicious for malignancy, but do not meet the   cytological and architectural threshold for a diagnosis of malignancy. Dr. Gonzales has reviewed selected slides and agrees with the interpretation.      Of note, this diagnosis places this patient in prognostic grade group 2 (out of 5) of a newly proposed consensus grading scheme (see reference below).      Reference: Diego et al. The 2014 International Society of Urological Pathology (ISUP)  Consensus Conference   on [place] Grading of Prostatic Carcinoma: Definition of Grading Patterns and Proposal for a New Grading System. Am J Surg Pathol. 2016 Feb;40(2):244-52.      MICROSCOPIC DESCRIPTION:   A microscopic examination has been performed.      Clinical History: Elevated PSA, PSA 10.800      Electronically Signed By:      Aaron Early MD   04/12/2019 14:32  ---------------------------     Urology History  Associated Symptoms:  Edema  Mr. Madsen is 84 years old. He was sent by Ms. Mc because of PSA elevation. He had a very high normal PSA in 2012 but none between then and last year. Patient has mild  bladder outlet obstructive symptoms with IPSS score of 2, and does not feel he needs any help with the way he is voiding. Nocturia x2. No family history of prostate cancer. No history of urinary tract infections, hematuria, or any other previous  problems. Patient in with his daughter for follow-up of PSA elevation.  (February 7, 2019)     Patient returned for follow-up of PSA elevation to have another PSA drawn and for consideration of prostate biopsies. Patient in with his 2 daughters today. Stable with voiding without significant lower tract obstructive symptoms.  (April 11, 2019)     Mr. Madsen is in for first visit since his biopsies were made. I had recommended that he be managed conservatively because of his age. He was found to have prostate cancer on the biopsies and we had talked with his daughter about situation. Patient is doing well clinically and feels just as well as he did last time he was here when he had his biopsies.  He did not have his PSA drawn before he was seen. Patient in with his daughter, Kelley. (October 14, 2019)     Mr. Narayan has not been seen in nearly 1 year. He was in again today with his daughter Kelley. Patient feels he is voiding okay. No burning on urination and no visible blood in the urine. He has not had any hospitalizations or major health  problems since he was last seen in 11 months ago. Last appointment delayed because of the coronavirus problem.  (September 16, 2020)     PSA Results:  Past PSA Results   PSA was 14.400 on April 19, 2021  PSA  was 17.000 on 9/16/2020  PSA was 12.300 on October 14, 2019  PSA was 10.800 on April 11, 2019  PSA was 9.760 on 11/16/2018  PSA was 11.300 on 8/15/2018  PSA was 3.86 on October 9, 2012  ------------------------------------------------------------------------------------------------------------------------------------------------------------------------------------------------------------------------------------------------------------------------------------------------------------------------PSA was 16.500 on October 20, 2021  PSA was 19.800 on April 25, 2022  PSA was 20.000 on October 25, 2022  PSA was 21.300 on April 25, 2023     All the above notes are from the old clinic system except for the PSA done today which is down slightly from last time.  Patient doing okay clinically.  Nocturia x2 with no worsening bladder outlet obstructive symptoms.  He has actually gained 6 lb since last visit.  Patient in with his daughter. (April 19, 2021)     Mr. Madsen is in for 6 month follow-up of prostate cancer.  He had his PSA done and results are pending.  No new health issues that he is aware of.  8 lb weight loss since last visit.  he had gained 6 pounds last time.  Patient in with his daughter Jadyn.  (October 20, 2021)     Mr. Narayan is in for his 6 month follow-up for prostate cancer.  PSA has been drawn and is pending.  No worsening bladder outlet obstructive symptoms or any other  problems.  He had to have a trip to the emergency room in November because of a fall injuring his head but otherwise has had a good 6 months.  PSA pending.  (April 25, 2022)     Mr. Madsen is in for his 6 month follow-up of prostate cancer.  He has had a good 6 months in general without any worsening bladder outlet  obstructive symptoms or any other health problems.  Continues to have nocturia 1-2 times nightly.  He has had no additional falls or any other problems.  Overall good 6 months.  PSA drawn and results pending.  [October 25, 2022]    Mr. Madsen is in for six-month follow-up of prostate cancer.  He has had no new health issues since he was here last time.  2 lb weight loss.  He still has nocturia a couple times nightly.  PSA drawn and results pending.  Patient in today with his daughter Jadyn. [April 25, 2023]     Review of Systems       Objective     Physical Exam  Constitutional:       Appearance: Normal appearance. He is normal weight.   Genitourinary:     Prostate: Normal.      Rectum: Normal.      Comments: Prostate gland 25-30 g smooth firm symmetrical  Neurological:      Mental Status: He is alert.   Psychiatric:         Mood and Affect: Mood normal.         Behavior: Behavior normal.   Urinalysis revealed occasional pus cells with occasional epithelial cells.  The dipstick had a trace of protein with pH 5.0 and specific gravity 1.025     Assessment and Plan     Malignant neoplasm of prostate  Elevated PSA  Bladder outlet obstruction  Chronic prostatitis  Essential hypertension  ---------------------------------    Patient seems to be stable at age 88.  PSA has been going up slowly.  PSA returned after patient left as 21.300.  We will notify his daughter of the results but that is showing only slow upper progression and I do not recommend anymore aggressive management of his prostate cancer.  Six month appointment with PSA or sooner if needed.

## 2023-04-25 NOTE — PATIENT INSTRUCTIONS
Patient seems to be stable at age 88.  PSA has been going up slowly.  PSA returned after patient left as 21.300.  We will notify his daughter of the results but that is showing only slow upper progression and I do not recommend anymore aggressive management of his prostate cancer.  Six month appointment with PSA or sooner if needed.

## 2023-05-09 DIAGNOSIS — Z71.89 COMPLEX CARE COORDINATION: ICD-10-CM

## 2023-06-13 DIAGNOSIS — I10 ESSENTIAL (PRIMARY) HYPERTENSION: ICD-10-CM

## 2023-06-13 RX ORDER — LABETALOL 100 MG/1
TABLET, FILM COATED ORAL
Qty: 180 TABLET | Refills: 0 | OUTPATIENT
Start: 2023-06-13

## 2023-06-13 NOTE — TELEPHONE ENCOUNTER
Spoke with pt's daughter over the phone, informed her that pt needs an OV in order to get his refills. Pt scheduled for an AWV on 06/15/2023 at 10:00am and PCP can do his refills during that OV. Daughter states she understands and will remind pt about upcoming appt.

## 2023-06-13 NOTE — PROGRESS NOTES
[facility]      PATIENT NAME: Gavin Madsen   : 1935    AGE: 88 y.o. DATE: 06/15/2023   MRN: [id number]        Reason for Visit / Chief Complaint: Medicare AWV (Subsequent Medicare AWV  )        Gavin Madsen presents for a Subsequent Medicare AWV today.     The following components were reviewed and updated:    Medical/Social/Family History:  Past Medical History:   Diagnosis Date    Elevated PSA     Hypertension     Malignant neoplasm of prostate         Family History   Problem Relation Age of Onset    Hypertension Father     Hypertension Mother     Diabetes Mother     Prostate cancer Brother     Cervical cancer Daughter     Hypertension Daughter     Lupus Son         Past Surgical History:   Procedure Laterality Date    BIOPSY WITH TRANSRECTAL ULTRASOUND (TRUS) GUIDANCE Bilateral 2019    [place] 4+3=7       Social History     Tobacco Use   Smoking Status Never   Smokeless Tobacco Never       Social History     Substance and Sexual Activity   Alcohol Use Never         Allergies and Current Medications     Review of patient's allergies indicates:   Allergen Reactions    Aspirin        Current Outpatient Medications:     amLODIPine (NORVASC) 5 MG tablet, Take 1 tablet (5 mg total) by mouth 2 (two) times daily., Disp: 180 tablet, Rfl: 0    atorvastatin (LIPITOR) 20 MG tablet, TAKE ONE TABLET BY MOUTH EVERY EVENING, Disp: 90 tablet, Rfl: 0    fluticasone propionate (FLONASE) 50 mcg/actuation nasal spray, 2 sprays (100 mcg total) by Each Nostril route once daily. (Patient not taking: Reported on 2023), Disp: 16 g, Rfl: 2    labetaloL (NORMODYNE) 100 MG tablet, Take 1 tablet (100 mg total) by mouth 2 (two) times daily., Disp: 180 tablet, Rfl: 0      Health Risk Assessment   Fall Risk: no   Obesity: BMI Body mass index is 18.93 kg/m².   Advance Directive:  Does not have an advanced directive.  Verbal information given and written information provided on today's  AVS.   Depression: PHQ9- 0   HTN:  DASH diet, exercise, weight management, med compliance, home BP monitoring, and follow-up discussed.   STI: not at risk   Statin Use: yes      Health Maintenance   Last eye exam: years since last eye exam, declines referral   Last CV screen with lipids: 09/12/2022   Diabetes screening with fasting glucose or A1c: 09/12/2022   DEXA: NA              Last PSA screen: 04/25/2023   Colonoscopy: he is unsure if has ever had, advanced age   Flu Vaccine: out of seaseon   Pneumonia vaccines: unsure if had, possibly [facility],  declines   COVID vaccine: 03/04/2021 01/06/2022   Hep B vaccine: NA  AAA screening: NA   HIV Screening: not high risk  Hepatitis C Screen: not high risk  Low Dose CT Scan: NA    Health Maintenance Topics with due status: Not Due       Topic Last Completion Date    TETANUS VACCINE 11/12/2021    Lipid Panel 09/12/2022    PROSTATE-SPECIFIC ANTIGEN 04/25/2023     Health Maintenance Due   Topic Date Due    Pneumococcal Vaccines (Age 65+) (1 - PCV) Never done    Shingles Vaccine (1 of 2) Never done    COVID-19 Vaccine (3 - Booster for Fred series) 03/03/2022         Lab results available in Epic or see dates from [facility] above:   Lab Results   Component Value Date    CHOL 134 09/12/2022    CHOL 117 07/16/2021     Lab Results   Component Value Date    HDL 64 (H) 09/12/2022    HDL 63 (H) 07/16/2021     Lab Results   Component Value Date    LDLCALC 53 09/12/2022    LDLCALC 38 07/16/2021     Lab Results   Component Value Date    TRIG 86 09/12/2022    TRIG 78 07/16/2021         No results found for: LABA1C, HGBA1C    Sodium   Date Value Ref Range Status   09/12/2022 142 136 - 145 mmol/L Final     Potassium   Date Value Ref Range Status   09/12/2022 4.1 3.5 - 5.1 mmol/L Final     Chloride   Date Value Ref Range Status   09/12/2022 110 (H) 98 - 107 mmol/L Final     CO2   Date Value Ref Range Status   09/12/2022 28 21 - 32 mmol/L Final     Glucose   Date Value Ref  "Range Status   09/12/2022 91 74 - 106 mg/dL Final     BUN   Date Value Ref Range Status   09/12/2022 15 7 - 18 mg/dL Final     Creatinine   Date Value Ref Range Status   09/12/2022 0.84 0.70 - 1.30 mg/dL Final     Calcium   Date Value Ref Range Status   09/12/2022 9.6 8.5 - 10.1 mg/dL Final     Total Protein   Date Value Ref Range Status   04/04/2022 7.0 6.4 - 8.2 g/dL Final     Albumin   Date Value Ref Range Status   04/04/2022 4.0 3.5 - 5.0 g/dL Final     Bilirubin, Total   Date Value Ref Range Status   04/04/2022 0.6 0.0 - 1.2 mg/dL Final     Alk Phos   Date Value Ref Range Status   04/04/2022 70 45 - 115 U/L Final     AST   Date Value Ref Range Status   04/04/2022 21 15 - 37 U/L Final     ALT   Date Value Ref Range Status   04/04/2022 29 16 - 61 U/L Final     Anion Gap   Date Value Ref Range Status   09/12/2022 8 7 - 16 mmol/L Final     eGFR    Date Value Ref Range Status   11/12/2021 110 >=60 mL/min/1.73m² Final     eGFR   Date Value Ref Range Status   04/04/2022 78 >=60 mL/min/1.73m² Final         Lab Results   Component Value Date    PSA 21.300 (H) 04/25/2023    PSA 20.000 (H) 10/25/2022    PSA 19.800 (H) 04/25/2022          Incontinence  Bowel: no  Bladder: no      Care Team:  Dr. Chan  Urology          **See Completed Assessments for Annual Wellness visit within the encounter summary    The following assessments were completed & reviewed:  Depression Screening  Cognitive function Screening  Timed Get Up Test  Whisper Test  Vision Screen  Health Risk Assessment  Checklist of ADLs and IADLs  Opioid Risk Assessment        Objective  Vitals:    06/15/23 1010 06/15/23 1028   BP: (!) 152/72 (!) 154/73   Pulse: (!) 47    Resp: 12    Temp: 97.8 °F (36.6 °C)    TempSrc: Oral    SpO2: 98%    Weight: 59.8 kg (131 lb 14.4 oz)    Height: 5' 10" (1.778 m)    PainSc: 0-No pain       Body mass index is 18.93 kg/m².  Ideal body weight: 73 kg (160 lb 15 oz)       Physical Exam  Constitutional: He is oriented " to person, place, and time. No distress.   HENT:   Head: Atraumatic.   Cardiovascular: Regular rhythm. Bradycardia present.   Asymptomatic bradycardia  Pulmonary:      Effort: Pulmonary effort is normal. No respiratory distress.      Breath sounds: Normal breath sounds. No wheezing, rhonchi or rales.      Abdominal: Soft. Normal appearance and bowel sounds are normal. He exhibits no distension. There is no abdominal tenderness.   Musculoskeletal:         General: Normal range of motion.      Cervical back: Neck supple.      Right lower leg: No edema.      Left lower leg: No edema.   Neurological: He is alert and oriented to person, place, and time. Gait normal.   Skin: Skin is warm and dry.   Psychiatric: His behavior is normal. Mood normal.     Assessment:     1. Encounter for subsequent annual wellness visit (AWV) in Medicare patient    2. Essential (primary) hypertension    3. Hyperlipidemia, unspecified hyperlipidemia type    4. Malignant neoplasm of prostate    5. BMI less than 19,adult       Problem List Items Addressed This Visit          Cardiac/Vascular    Essential (primary) hypertension    Hyperlipidemia       Oncology    Malignant neoplasm of prostate (Chronic)     Other Visit Diagnoses       Encounter for subsequent annual wellness visit (AWV) in Medicare patient    -  Primary    BMI less than 19,adult                Plan:    Referrals:   No referrals at this time   Refills provided on routine medications per pt request       Discussed and provided with a screening schedule and personal prevention plan in accordance with USPSTF age appropriate recommendations and Medicare screening guidelines.   Education, counseling, and referrals were provided as needed.  After Visit Summary printed and given to patient which includes written education and a list of any referrals if indicated.     Education including diet, exercise, falls and advanced directives discussed with patient and patient verbalized  understanding.      F/u plan for yearly AWV.    Signature: Jeanette Mc, FNP-BC

## 2023-06-13 NOTE — TELEPHONE ENCOUNTER
----- Message from Ignacia Gilliam sent at 6/13/2023 10:37 AM CDT -----  Regarding: med refill  Refill request for Labetalol  mg called into [facility]

## 2023-06-15 ENCOUNTER — OFFICE VISIT (OUTPATIENT)
Dept: FAMILY MEDICINE | Facility: CLINIC | Age: 88
End: 2023-06-15
Payer: MEDICARE

## 2023-06-15 VITALS
WEIGHT: 131.88 LBS | OXYGEN SATURATION: 98 % | HEIGHT: 70 IN | RESPIRATION RATE: 12 BRPM | TEMPERATURE: 98 F | SYSTOLIC BLOOD PRESSURE: 154 MMHG | BODY MASS INDEX: 18.88 KG/M2 | HEART RATE: 47 BPM | DIASTOLIC BLOOD PRESSURE: 73 MMHG

## 2023-06-15 DIAGNOSIS — E78.5 HYPERLIPIDEMIA, UNSPECIFIED HYPERLIPIDEMIA TYPE: ICD-10-CM

## 2023-06-15 DIAGNOSIS — I10 ESSENTIAL (PRIMARY) HYPERTENSION: ICD-10-CM

## 2023-06-15 DIAGNOSIS — Z00.00 ENCOUNTER FOR SUBSEQUENT ANNUAL WELLNESS VISIT (AWV) IN MEDICARE PATIENT: Primary | ICD-10-CM

## 2023-06-15 DIAGNOSIS — C61 MALIGNANT NEOPLASM OF PROSTATE: Chronic | ICD-10-CM

## 2023-06-15 PROCEDURE — G0439 PR MEDICARE ANNUAL WELLNESS SUBSEQUENT VISIT: ICD-10-PCS | Mod: ,,, | Performed by: NURSE PRACTITIONER

## 2023-06-15 PROCEDURE — G0439 PPPS, SUBSEQ VISIT: HCPCS | Mod: ,,, | Performed by: NURSE PRACTITIONER

## 2023-06-15 RX ORDER — ATORVASTATIN CALCIUM 20 MG/1
20 TABLET, FILM COATED ORAL NIGHTLY
Qty: 90 TABLET | Refills: 0 | Status: SHIPPED | OUTPATIENT
Start: 2023-06-15 | End: 2023-10-31 | Stop reason: SDUPTHER

## 2023-06-15 RX ORDER — LABETALOL 100 MG/1
100 TABLET, FILM COATED ORAL 2 TIMES DAILY
Qty: 180 TABLET | Refills: 0 | Status: SHIPPED | OUTPATIENT
Start: 2023-06-15 | End: 2023-10-31

## 2023-06-15 RX ORDER — AMLODIPINE BESYLATE 5 MG/1
5 TABLET ORAL 2 TIMES DAILY
Qty: 180 TABLET | Refills: 0 | Status: SHIPPED | OUTPATIENT
Start: 2023-06-15 | End: 2023-10-31 | Stop reason: SDUPTHER

## 2023-08-14 ENCOUNTER — OFFICE VISIT (OUTPATIENT)
Dept: FAMILY MEDICINE | Facility: CLINIC | Age: 88
End: 2023-08-14
Payer: MEDICARE

## 2023-08-14 ENCOUNTER — CLINICAL SUPPORT (OUTPATIENT)
Dept: RESPIRATORY THERAPY | Facility: HOSPITAL | Age: 88
End: 2023-08-14
Attending: NURSE PRACTITIONER
Payer: MEDICARE

## 2023-08-14 VITALS
WEIGHT: 128.69 LBS | DIASTOLIC BLOOD PRESSURE: 69 MMHG | HEIGHT: 70 IN | HEART RATE: 51 BPM | SYSTOLIC BLOOD PRESSURE: 143 MMHG | OXYGEN SATURATION: 98 % | BODY MASS INDEX: 18.42 KG/M2

## 2023-08-14 DIAGNOSIS — R53.1 GENERALIZED WEAKNESS: ICD-10-CM

## 2023-08-14 DIAGNOSIS — I10 ESSENTIAL (PRIMARY) HYPERTENSION: ICD-10-CM

## 2023-08-14 DIAGNOSIS — R53.83 FATIGUE, UNSPECIFIED TYPE: ICD-10-CM

## 2023-08-14 DIAGNOSIS — C61 MALIGNANT NEOPLASM OF PROSTATE: ICD-10-CM

## 2023-08-14 DIAGNOSIS — E78.5 HYPERLIPIDEMIA, UNSPECIFIED HYPERLIPIDEMIA TYPE: ICD-10-CM

## 2023-08-14 DIAGNOSIS — I10 ESSENTIAL (PRIMARY) HYPERTENSION: Primary | ICD-10-CM

## 2023-08-14 DIAGNOSIS — R00.1 BRADYCARDIA: ICD-10-CM

## 2023-08-14 LAB
ALBUMIN SERPL BCP-MCNC: 3.9 G/DL (ref 3.5–5)
ALBUMIN/GLOB SERPL: 1.3 {RATIO}
ALP SERPL-CCNC: 65 U/L (ref 45–115)
ALT SERPL W P-5'-P-CCNC: 28 U/L (ref 16–61)
ANION GAP SERPL CALCULATED.3IONS-SCNC: 8 MMOL/L (ref 7–16)
AST SERPL W P-5'-P-CCNC: 20 U/L (ref 15–37)
BASOPHILS # BLD AUTO: 0.04 K/UL (ref 0–0.2)
BASOPHILS NFR BLD AUTO: 0.8 % (ref 0–1)
BILIRUB SERPL-MCNC: 0.7 MG/DL (ref ?–1.2)
BUN SERPL-MCNC: 16 MG/DL (ref 7–18)
BUN/CREAT SERPL: 20 (ref 6–20)
CALCIUM SERPL-MCNC: 9.6 MG/DL (ref 8.5–10.1)
CHLORIDE SERPL-SCNC: 113 MMOL/L (ref 98–107)
CO2 SERPL-SCNC: 27 MMOL/L (ref 21–32)
CREAT SERPL-MCNC: 0.82 MG/DL (ref 0.7–1.3)
DIFFERENTIAL METHOD BLD: ABNORMAL
EGFR (NO RACE VARIABLE) (RUSH/TITUS): 86 ML/MIN/1.73M2
EOSINOPHIL # BLD AUTO: 0.36 K/UL (ref 0–0.5)
EOSINOPHIL NFR BLD AUTO: 6.9 % (ref 1–4)
ERYTHROCYTE [DISTWIDTH] IN BLOOD BY AUTOMATED COUNT: 12.8 % (ref 11.5–14.5)
GLOBULIN SER-MCNC: 3.1 G/DL (ref 2–4)
GLUCOSE SERPL-MCNC: 99 MG/DL (ref 74–106)
HCT VFR BLD AUTO: 34.8 % (ref 40–54)
HGB BLD-MCNC: 11.5 G/DL (ref 13.5–18)
IMM GRANULOCYTES # BLD AUTO: 0.01 K/UL (ref 0–0.04)
IMM GRANULOCYTES NFR BLD: 0.2 % (ref 0–0.4)
LYMPHOCYTES # BLD AUTO: 1.79 K/UL (ref 1–4.8)
LYMPHOCYTES NFR BLD AUTO: 34.3 % (ref 27–41)
MAGNESIUM SERPL-MCNC: 2.5 MG/DL (ref 1.7–2.3)
MCH RBC QN AUTO: 30.9 PG (ref 27–31)
MCHC RBC AUTO-ENTMCNC: 33 G/DL (ref 32–36)
MCV RBC AUTO: 93.5 FL (ref 80–96)
MONOCYTES # BLD AUTO: 0.59 K/UL (ref 0–0.8)
MONOCYTES NFR BLD AUTO: 11.3 % (ref 2–6)
MPC BLD CALC-MCNC: 11.8 FL (ref 9.4–12.4)
NEUTROPHILS # BLD AUTO: 2.43 K/UL (ref 1.8–7.7)
NEUTROPHILS NFR BLD AUTO: 46.5 % (ref 53–65)
NRBC # BLD AUTO: 0 X10E3/UL
NRBC, AUTO (.00): 0 %
PLATELET # BLD AUTO: 150 K/UL (ref 150–400)
POTASSIUM SERPL-SCNC: 3.6 MMOL/L (ref 3.5–5.1)
PROT SERPL-MCNC: 7 G/DL (ref 6.4–8.2)
PSA SERPL-MCNC: 24.2 NG/ML
RBC # BLD AUTO: 3.72 M/UL (ref 4.6–6.2)
SODIUM SERPL-SCNC: 144 MMOL/L (ref 136–145)
WBC # BLD AUTO: 5.22 K/UL (ref 4.5–11)

## 2023-08-14 PROCEDURE — 84153 PSA, TOTAL (DIAGNOSTIC): ICD-10-PCS | Mod: ,,, | Performed by: CLINICAL MEDICAL LABORATORY

## 2023-08-14 PROCEDURE — 84153 ASSAY OF PSA TOTAL: CPT | Mod: ,,, | Performed by: CLINICAL MEDICAL LABORATORY

## 2023-08-14 PROCEDURE — 93005 ELECTROCARDIOGRAM TRACING: CPT

## 2023-08-14 PROCEDURE — 93010 ELECTROCARDIOGRAM REPORT: CPT | Performed by: HOSPITALIST

## 2023-08-14 PROCEDURE — 85025 CBC WITH DIFFERENTIAL: ICD-10-PCS | Mod: ,,, | Performed by: CLINICAL MEDICAL LABORATORY

## 2023-08-14 PROCEDURE — 99213 OFFICE O/P EST LOW 20 MIN: CPT | Mod: ,,, | Performed by: NURSE PRACTITIONER

## 2023-08-14 PROCEDURE — 99213 PR OFFICE/OUTPT VISIT, EST, LEVL III, 20-29 MIN: ICD-10-PCS | Mod: ,,, | Performed by: NURSE PRACTITIONER

## 2023-08-14 PROCEDURE — 83735 ASSAY OF MAGNESIUM: CPT | Mod: ,,, | Performed by: CLINICAL MEDICAL LABORATORY

## 2023-08-14 PROCEDURE — 83735 MAGNESIUM: ICD-10-PCS | Mod: ,,, | Performed by: CLINICAL MEDICAL LABORATORY

## 2023-08-14 PROCEDURE — 80053 COMPREHEN METABOLIC PANEL: CPT | Mod: ,,, | Performed by: CLINICAL MEDICAL LABORATORY

## 2023-08-14 PROCEDURE — 80053 COMPREHENSIVE METABOLIC PANEL: ICD-10-PCS | Mod: ,,, | Performed by: CLINICAL MEDICAL LABORATORY

## 2023-08-14 PROCEDURE — 85025 COMPLETE CBC W/AUTO DIFF WBC: CPT | Mod: ,,, | Performed by: CLINICAL MEDICAL LABORATORY

## 2023-08-14 NOTE — PROGRESS NOTES
"Clinic note     Patient name: Gavin Madsen is a 88 y.o. male   Chief compliant   Chief Complaint   Patient presents with    Fatigue     States was feeling weak on Friday family check bp and was low.  Denies any dizziness states he is just tired and week. States he is staying cool and hydrated.        Subjective     History of present illness   Brought in to clinic by daughter for evaluation of generalized weakness and fatigue; daughter reports episode of low blood pressure on Friday before weakness and fatigue started  Blood pressure log reviewed and will be scanned into record     Mr Madsen denies any chest pain, shortness of breath, palpitations, dizziness; states he "just feels tired"     Hx of bradycardia, seen last by Dr Glez 8/13/2020, will place referral back to Dr Glez     Past Medical History: HTN, bradycardia, prostate cancer     Urology: Dr Chan, last clinic note reviewed             Social History     Tobacco Use    Smoking status: Never     Passive exposure: Never    Smokeless tobacco: Never   Substance Use Topics    Alcohol use: Never    Drug use: Never       Review of patient's allergies indicates:   Allergen Reactions    Aspirin        Past Medical History:   Diagnosis Date    Elevated PSA     Hypertension     Malignant neoplasm of prostate        Past Surgical History:   Procedure Laterality Date    BIOPSY WITH TRANSRECTAL ULTRASOUND (TRUS) GUIDANCE Bilateral 04/11/2019    [place] 4+3=7        Family History   Problem Relation Age of Onset    Hypertension Father     Hypertension Mother     Diabetes Mother     Prostate cancer Brother     Cervical cancer Daughter     Hypertension Daughter     Lupus Son          Current Outpatient Medications:     amLODIPine (NORVASC) 5 MG tablet, Take 1 tablet (5 mg total) by mouth 2 (two) times daily., Disp: 180 tablet, Rfl: 0    atorvastatin (LIPITOR) 20 MG tablet, Take 1 tablet (20 mg total) by mouth every evening., Disp: 90 tablet, Rfl: 0    labetaloL " "(NORMODYNE) 100 MG tablet, Take 1 tablet (100 mg total) by mouth 2 (two) times daily., Disp: 180 tablet, Rfl: 0    Review of Systems   Constitutional:  Positive for fatigue. Negative for activity change, appetite change, chills, fever and unexpected weight change.   Respiratory:  Negative for cough and shortness of breath.    Cardiovascular:  Negative for chest pain, palpitations and leg swelling.   Gastrointestinal:  Negative for abdominal pain, blood in stool, change in bowel habit, constipation, diarrhea, nausea, vomiting and change in bowel habit.   Genitourinary:  Negative for difficulty urinating and dysuria.   Musculoskeletal:  Negative for arthralgias, gait problem and myalgias.   Neurological:  Positive for weakness. Negative for dizziness, light-headedness, headaches, coordination difficulties and coordination difficulties.        Generalized weakness    Psychiatric/Behavioral:  Negative for confusion, dysphoric mood and sleep disturbance. The patient is not nervous/anxious.        Objective     BP (!) 143/69   Pulse (!) 51   Ht 5' 10" (1.778 m)   Wt 58.4 kg (128 lb 11.2 oz)   SpO2 98%   BMI 18.47 kg/m²     Physical Exam   Constitutional: He is oriented to person, place, and time. normal appearance. No distress.   HENT:   Head: Atraumatic.   Mouth/Throat: Mucous membranes are moist.   Cardiovascular: Regular rhythm. Bradycardia present.   Manual rate count 52 Pulmonary:      Effort: Pulmonary effort is normal. No respiratory distress.      Breath sounds: Normal breath sounds. No wheezing, rhonchi or rales.     Abdominal: Soft. Normal appearance and bowel sounds are normal. He exhibits no distension. There is no abdominal tenderness.   Musculoskeletal:         General: Normal range of motion.      Cervical back: Neck supple.      Right lower leg: No edema.      Left lower leg: No edema.   Neurological: He is alert and oriented to person, place, and time. Gait normal.   Skin: Skin is warm and dry. " "  Psychiatric: His behavior is normal. Mood normal.       Lab Results   Component Value Date    WBC 4.62 09/12/2022    HGB 12.5 (L) 09/12/2022    HCT 39.8 (L) 09/12/2022    MCV 95.4 09/12/2022     09/12/2022       CMP  Sodium   Date Value Ref Range Status   09/12/2022 142 136 - 145 mmol/L Final     Potassium   Date Value Ref Range Status   09/12/2022 4.1 3.5 - 5.1 mmol/L Final     Chloride   Date Value Ref Range Status   09/12/2022 110 (H) 98 - 107 mmol/L Final     CO2   Date Value Ref Range Status   09/12/2022 28 21 - 32 mmol/L Final     Glucose   Date Value Ref Range Status   09/12/2022 91 74 - 106 mg/dL Final     BUN   Date Value Ref Range Status   09/12/2022 15 7 - 18 mg/dL Final     Creatinine   Date Value Ref Range Status   09/12/2022 0.84 0.70 - 1.30 mg/dL Final     Calcium   Date Value Ref Range Status   09/12/2022 9.6 8.5 - 10.1 mg/dL Final     Total Protein   Date Value Ref Range Status   04/04/2022 7.0 6.4 - 8.2 g/dL Final     Albumin   Date Value Ref Range Status   04/04/2022 4.0 3.5 - 5.0 g/dL Final     Bilirubin, Total   Date Value Ref Range Status   04/04/2022 0.6 0.0 - 1.2 mg/dL Final     Alk Phos   Date Value Ref Range Status   04/04/2022 70 45 - 115 U/L Final     AST   Date Value Ref Range Status   04/04/2022 21 15 - 37 U/L Final     ALT   Date Value Ref Range Status   04/04/2022 29 16 - 61 U/L Final     Anion Gap   Date Value Ref Range Status   09/12/2022 8 7 - 16 mmol/L Final     eGFR    Date Value Ref Range Status   11/12/2021 110 >=60 mL/min/1.73m² Final     eGFR   Date Value Ref Range Status   04/04/2022 78 >=60 mL/min/1.73m² Final     No results found for: "TSH"  Lab Results   Component Value Date    CHOL 134 09/12/2022    CHOL 117 07/16/2021     Lab Results   Component Value Date    HDL 64 (H) 09/12/2022    HDL 63 (H) 07/16/2021     Lab Results   Component Value Date    LDLCALC 53 09/12/2022    LDLCALC 38 07/16/2021     Lab Results   Component Value Date    TRIG 86 " "09/12/2022    TRIG 78 07/16/2021     Lab Results   Component Value Date    CHOLHDL 2.1 09/12/2022    CHOLHDL 1.9 07/16/2021     No results found for: "LABA1C", "HGBA1C"      Assessment and Plan   Essential (primary) hypertension  -     CBC Auto Differential; Future; Expected date: 08/14/2023  -     Comprehensive Metabolic Panel; Future; Expected date: 08/14/2023  -     Magnesium; Future; Expected date: 08/14/2023  -     EKG 12-lead; Future  -     Ambulatory referral/consult to Cardiology; Future; Expected date: 08/21/2023    Hyperlipidemia, unspecified hyperlipidemia type    Generalized weakness  -     EKG 12-lead; Future  -     Ambulatory referral/consult to Cardiology; Future; Expected date: 08/21/2023    Fatigue, unspecified type  -     EKG 12-lead; Future    Bradycardia  -     Ambulatory referral/consult to Cardiology; Future; Expected date: 08/21/2023    Malignant neoplasm of prostate  -     PSA, Total (Diagnostic)          Patient Instructions  Patient Instructions   Lab obtained in clinic today, we will notify you of results and any necessary changes to plan of care   EKG to be obtained at [facility] today, we will notify you of results and any necessary changes to plan of care     We will schedule a follow up appointment with Dr Glez, cardiology\                                      "

## 2023-08-14 NOTE — PATIENT INSTRUCTIONS
Lab obtained in clinic today, we will notify you of results and any necessary changes to plan of care   EKG to be obtained at [facility] today, we will notify you of results and any necessary changes to plan of care     We will schedule a follow up appointment with Dr Glez, cardiology\

## 2023-08-17 ENCOUNTER — OFFICE VISIT (OUTPATIENT)
Dept: CARDIOLOGY | Facility: CLINIC | Age: 88
End: 2023-08-17
Payer: MEDICARE

## 2023-08-17 VITALS
OXYGEN SATURATION: 97 % | HEART RATE: 56 BPM | DIASTOLIC BLOOD PRESSURE: 84 MMHG | SYSTOLIC BLOOD PRESSURE: 160 MMHG | BODY MASS INDEX: 18.32 KG/M2 | HEIGHT: 70 IN | WEIGHT: 128 LBS

## 2023-08-17 DIAGNOSIS — R53.1 GENERALIZED WEAKNESS: ICD-10-CM

## 2023-08-17 DIAGNOSIS — R00.1 BRADYCARDIA: ICD-10-CM

## 2023-08-17 DIAGNOSIS — I10 ESSENTIAL (PRIMARY) HYPERTENSION: ICD-10-CM

## 2023-08-17 DIAGNOSIS — R09.89 BRUIT OF LEFT CAROTID ARTERY: Primary | ICD-10-CM

## 2023-08-17 PROCEDURE — 99214 OFFICE O/P EST MOD 30 MIN: CPT | Mod: PBBFAC | Performed by: INTERNAL MEDICINE

## 2023-08-17 PROCEDURE — 93246 EXT ECG>7D<15D RECORDING: CPT | Performed by: INTERNAL MEDICINE

## 2023-08-17 PROCEDURE — 99205 PR OFFICE/OUTPT VISIT, NEW, LEVL V, 60-74 MIN: ICD-10-PCS | Mod: S$PBB,,, | Performed by: INTERNAL MEDICINE

## 2023-08-17 PROCEDURE — 99205 OFFICE O/P NEW HI 60 MIN: CPT | Mod: S$PBB,,, | Performed by: INTERNAL MEDICINE

## 2023-08-17 RX ORDER — IBUPROFEN 100 MG/5ML
1000 SUSPENSION, ORAL (FINAL DOSE FORM) ORAL DAILY
COMMUNITY

## 2023-08-17 RX ORDER — LOSARTAN POTASSIUM 50 MG/1
50 TABLET ORAL DAILY
Qty: 90 TABLET | Refills: 1 | Status: SHIPPED | OUTPATIENT
Start: 2023-08-17 | End: 2024-01-23 | Stop reason: SDUPTHER

## 2023-08-17 NOTE — PROGRESS NOTES
Cardiology Clinic Note:    PCP: Jeanette Mc FNP    REFERRING PHYSICIAN:     CHIEF COMPLAINT: Chest apoin    HISTORY OF PRESENT ILLNESS:  Gavin Madsen is a 88 y.o. 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  He does have occasional swelling in left foot.  He is referred for eval of slow heart rate.  He denies dizziness or lightheadedness.  His wife and daughter at bedside report his is active, however has been slowing down over last several months.  He has episodes of weakness and fatigue, appear to come and go spontaneously. He denies chest pain, pressure or shortness of breath. He reportedly had a stress test and echo done around 2020, both studies were reportedly normal.       Review of Systems:     Review of Systems   Constitutional:  Positive for malaise/fatigue and weight loss.   HENT: Negative.     Eyes: Negative.    Respiratory: Negative.     Cardiovascular: Negative.    Gastrointestinal: Negative.    Genitourinary: Negative.    Musculoskeletal: Negative.    Skin: Negative.    Neurological: Negative.    Endo/Heme/Allergies: Negative.    Psychiatric/Behavioral: Negative.        PAST MEDICAL HISTORY:  Past Medical History:   Diagnosis Date    Elevated PSA     Hypertension     Malignant neoplasm of prostate        PAST SURGICAL HISTORY:  Past Surgical History:   Procedure Laterality Date    BIOPSY WITH TRANSRECTAL ULTRASOUND (TRUS) GUIDANCE Bilateral 04/11/2019    [place] 4+3=7       SOCIAL HISTORY:  Social History     Socioeconomic History    Marital status: Single   Tobacco Use    Smoking status: Never     Passive exposure: Never    Smokeless tobacco: Never   Substance and Sexual Activity    Alcohol use: Never    Drug use: Never    Sexual activity: Not Currently       FAMILY HISTORY:  Family History   Problem Relation Age of Onset    Hypertension Father     Hypertension Mother     Diabetes Mother     Prostate cancer Brother     Cervical cancer Daughter      "Hypertension Daughter     Lupus Son        ALLERGIES:  Allergies as of 08/17/2023 - Reviewed 08/17/2023   Allergen Reaction Noted    Aspirin  04/19/2021         MEDICATIONS:  Current Outpatient Medications on File Prior to Visit   Medication Sig Dispense Refill    amLODIPine (NORVASC) 5 MG tablet Take 1 tablet (5 mg total) by mouth 2 (two) times daily. 180 tablet 0    ascorbic acid, vitamin C, (VITAMIN C) 1000 MG tablet Take 1,000 mg by mouth once daily.      atorvastatin (LIPITOR) 20 MG tablet Take 1 tablet (20 mg total) by mouth every evening. 90 tablet 0    labetaloL (NORMODYNE) 100 MG tablet Take 1 tablet (100 mg total) by mouth 2 (two) times daily. 180 tablet 0     No current facility-administered medications on file prior to visit.          PHYSICAL EXAM:  Blood pressure (!) 160/84, pulse (!) 56, height 5' 10" (1.778 m), weight 58.1 kg (128 lb), SpO2 97 %.  Wt Readings from Last 3 Encounters:   08/17/23 58.1 kg (128 lb)   08/14/23 58.4 kg (128 lb 11.2 oz)   06/15/23 59.8 kg (131 lb 14.4 oz)      Body mass index is 18.37 kg/m².    Physical Exam  Constitutional:       Appearance: Normal appearance. He is normal weight.   HENT:      Head: Normocephalic and atraumatic.      Right Ear: External ear normal.      Left Ear: External ear normal.      Mouth/Throat:      Mouth: Mucous membranes are moist.   Eyes:      Extraocular Movements: Extraocular movements intact.      Conjunctiva/sclera: Conjunctivae normal.      Pupils: Pupils are equal, round, and reactive to light.   Neck:      Vascular: Carotid bruit present.   Cardiovascular:      Rate and Rhythm: Regular rhythm. Bradycardia present.      Pulses: Normal pulses.      Heart sounds: No murmur heard.  Pulmonary:      Effort: Pulmonary effort is normal.      Breath sounds: Normal breath sounds.   Abdominal:      General: Abdomen is flat. Bowel sounds are normal.      Palpations: Abdomen is soft.   Musculoskeletal:         General: Normal range of motion. "   Skin:     General: Skin is warm and dry.   Neurological:      General: No focal deficit present.      Mental Status: He is alert.        LABS REVIEWED:  Lab Results   Component Value Date    WBC 5.22 08/14/2023    RBC 3.72 (L) 08/14/2023    HGB 11.5 (L) 08/14/2023    HCT 34.8 (L) 08/14/2023    MCV 93.5 08/14/2023    MCH 30.9 08/14/2023    MCHC 33.0 08/14/2023    RDW 12.8 08/14/2023     08/14/2023    MPV 11.8 08/14/2023    NRBC 0.0 08/14/2023     Lab Results   Component Value Date     08/14/2023    K 3.6 08/14/2023     (H) 08/14/2023    CO2 27 08/14/2023    BUN 16 08/14/2023    MG 2.5 (H) 08/14/2023     Lab Results   Component Value Date    AST 20 08/14/2023    ALT 28 08/14/2023     Lab Results   Component Value Date    GLU 99 08/14/2023     Lab Results   Component Value Date    CHOL 134 09/12/2022    HDL 64 (H) 09/12/2022    TRIG 86 09/12/2022    CHOLHDL 2.1 09/12/2022       CARDIAC STUDIES REVIEWED:    OTHER IMAGING STUDIES REVIEWED:        ASSESSMENT:   Essential (primary) hypertension  -     Ambulatory referral/consult to Cardiology    Generalized weakness  -     Ambulatory referral/consult to Cardiology    Bradycardia  -     Ambulatory referral/consult to Cardiology          PLAN:  1. Bradycardia: will place on Zio to monitor for bradycardia  2. Fatigue, unclear etiology, will order echo to eval LV function  3. Hypertension: on Normodyne, will DC, add Losartin 50 mg q d.   4. Hyperlipidemia: on Lipitor, at goal on lipid panel.   5. Left carotid bruit, order carotid ultrasound.     No aspirin due to true allergy       Follow up with pt and daughters to go over above results, medications changes.

## 2023-08-21 PROBLEM — R09.89 BRUIT OF LEFT CAROTID ARTERY: Status: ACTIVE | Noted: 2023-08-21

## 2023-08-25 ENCOUNTER — HOSPITAL ENCOUNTER (OUTPATIENT)
Dept: CARDIOLOGY | Facility: HOSPITAL | Age: 88
Discharge: HOME | End: 2023-08-25
Attending: INTERNAL MEDICINE
Payer: MEDICARE

## 2023-08-25 DIAGNOSIS — R53.1 GENERALIZED WEAKNESS: ICD-10-CM

## 2023-08-25 DIAGNOSIS — R00.1 BRADYCARDIA: ICD-10-CM

## 2023-08-25 PROCEDURE — 93306 TTE W/DOPPLER COMPLETE: CPT

## 2023-08-25 PROCEDURE — 93306 TTE W/DOPPLER COMPLETE: CPT | Mod: 26,,, | Performed by: INTERNAL MEDICINE

## 2023-08-25 PROCEDURE — 93306 ECHO (CUPID ONLY): ICD-10-PCS | Mod: 26,,, | Performed by: INTERNAL MEDICINE

## 2023-08-28 ENCOUNTER — HOSPITAL ENCOUNTER (OUTPATIENT)
Dept: RADIOLOGY | Facility: HOSPITAL | Age: 88
Discharge: HOME | End: 2023-08-28
Attending: INTERNAL MEDICINE
Payer: MEDICARE

## 2023-08-28 DIAGNOSIS — R09.89 BRUIT OF LEFT CAROTID ARTERY: ICD-10-CM

## 2023-08-28 LAB
AORTIC ROOT ANNULUS: 2.86 CM
AORTIC VALVE CUSP SEPERATION: 1.89 CM
AV INDEX (PROSTH): 0.76
AV MEAN GRADIENT: 5 MMHG
AV PEAK GRADIENT: 10 MMHG
AV VALVE AREA BY VELOCITY RATIO: 2.41 CM²
AV VALVE AREA: 2.56 CM²
AV VELOCITY RATIO: 0.72
CV ECHO LV RWT: 0.42 CM
DOP CALC AO PEAK VEL: 1.59 M/S
DOP CALC AO VTI: 30.9 CM
DOP CALC LVOT AREA: 3.4 CM2
DOP CALC LVOT DIAMETER: 2.07 CM
DOP CALC LVOT PEAK VEL: 1.14 M/S
DOP CALC LVOT STROKE VOLUME: 79.05 CM3
DOP CALCLVOT PEAK VEL VTI: 23.5 CM
E WAVE DECELERATION TIME: 224.67 MSEC
E/A RATIO: 0.74
E/E' RATIO: 7.25 M/S
ECHO LV POSTERIOR WALL: 0.89 CM (ref 0.6–1.1)
FRACTIONAL SHORTENING: 35 % (ref 28–44)
INTERVENTRICULAR SEPTUM: 0.9 CM (ref 0.6–1.1)
IVC DIAMETER: 0.82 CM
LA MAJOR: 2.81 CM
LEFT ATRIUM SIZE: 2.62 CM
LEFT INTERNAL DIMENSION IN SYSTOLE: 2.74 CM (ref 2.1–4)
LEFT VENTRICLE DIASTOLIC VOLUME: 79.07 ML
LEFT VENTRICLE SYSTOLIC VOLUME: 28.09 ML
LEFT VENTRICULAR INTERNAL DIMENSION IN DIASTOLE: 4.21 CM (ref 3.5–6)
LEFT VENTRICULAR MASS: 118.23 G
LV LATERAL E/E' RATIO: 7.25 M/S
LV SEPTAL E/E' RATIO: 7.25 M/S
LVOT MG: 2.89 MMHG
LVOT MV: 0.81 CM/S
MV PEAK A VEL: 0.78 M/S
MV PEAK E VEL: 0.58 M/S
PISA TR MAX VEL: 2.56 M/S
PV PEAK GRADIENT: 4 MMHG
PV PEAK VELOCITY: 0.99 M/S
RA MAJOR: 4.35 CM
RA PRESSURE ESTIMATED: 3 MMHG
RIGHT VENTRICULAR END-DIASTOLIC DIMENSION: 4.25 CM
RV TB RVSP: 6 MMHG
TDI LATERAL: 0.08 M/S
TDI SEPTAL: 0.08 M/S
TDI: 0.08 M/S
TR MAX PG: 26 MMHG
TRICUSPID ANNULAR PLANE SYSTOLIC EXCURSION: 2.46 CM
TV REST PULMONARY ARTERY PRESSURE: 29 MMHG

## 2023-08-28 PROCEDURE — 93880 EXTRACRANIAL BILAT STUDY: CPT | Mod: 26,,, | Performed by: STUDENT IN AN ORGANIZED HEALTH CARE EDUCATION/TRAINING PROGRAM

## 2023-08-28 PROCEDURE — 93880 EXTRACRANIAL BILAT STUDY: CPT | Mod: TC

## 2023-08-28 PROCEDURE — 93880 US CAROTID BILATERAL: ICD-10-PCS | Mod: 26,,, | Performed by: STUDENT IN AN ORGANIZED HEALTH CARE EDUCATION/TRAINING PROGRAM

## 2023-09-12 PROCEDURE — 93248 PR EXT ECG, CONT, > 7 DAYS <= 15 DAYS, REVIEW W/INT: ICD-10-PCS | Mod: ,,, | Performed by: INTERNAL MEDICINE

## 2023-09-12 PROCEDURE — 93248 EXT ECG>7D<15D REV&INTERPJ: CPT | Mod: ,,, | Performed by: INTERNAL MEDICINE

## 2023-09-19 ENCOUNTER — OFFICE VISIT (OUTPATIENT)
Dept: CARDIOLOGY | Facility: CLINIC | Age: 88
End: 2023-09-19
Payer: MEDICARE

## 2023-09-19 VITALS
SYSTOLIC BLOOD PRESSURE: 128 MMHG | HEART RATE: 68 BPM | OXYGEN SATURATION: 98 % | HEIGHT: 70 IN | DIASTOLIC BLOOD PRESSURE: 72 MMHG | WEIGHT: 126 LBS | BODY MASS INDEX: 18.04 KG/M2

## 2023-09-19 DIAGNOSIS — R00.1 BRADYCARDIA: ICD-10-CM

## 2023-09-19 DIAGNOSIS — I10 ESSENTIAL (PRIMARY) HYPERTENSION: Primary | ICD-10-CM

## 2023-09-19 DIAGNOSIS — E78.5 HYPERLIPIDEMIA, UNSPECIFIED HYPERLIPIDEMIA TYPE: ICD-10-CM

## 2023-09-19 PROCEDURE — 99214 OFFICE O/P EST MOD 30 MIN: CPT | Mod: S$PBB,,, | Performed by: INTERNAL MEDICINE

## 2023-09-19 PROCEDURE — 99214 PR OFFICE/OUTPT VISIT, EST, LEVL IV, 30-39 MIN: ICD-10-PCS | Mod: S$PBB,,, | Performed by: INTERNAL MEDICINE

## 2023-09-19 PROCEDURE — 99213 OFFICE O/P EST LOW 20 MIN: CPT | Mod: PBBFAC | Performed by: INTERNAL MEDICINE

## 2023-09-25 NOTE — PROGRESS NOTES
Cardiology Clinic Note:    PCP: Jeanette Mc FNP    REFERRING PHYSICIAN:     CHIEF COMPLAINT: Chest apoin    HISTORY OF PRESENT ILLNESS:  Gavin Madsen is a 88 y.o. male who presents for evaluation of bradycardia.  Pt  is acitve, mows lawn without symptoms.   He denies dizziness or lightheadedness.  His wife and daughter at bedside report his is active, however has been slowing down over last several months.  He has episodes of weakness and fatigue, appear to come and go spontaneously, last seconds up to a minute, will sit down, symptoms usually resolve before he sits down. . He denies chest pain, pressure or shortness of breath.     Review of Systems:     Review of Systems   Constitutional:  Positive for malaise/fatigue and weight loss.   HENT: Negative.     Eyes: Negative.    Respiratory: Negative.     Cardiovascular: Negative.    Gastrointestinal: Negative.    Genitourinary: Negative.    Musculoskeletal: Negative.    Skin: Negative.    Neurological: Negative.    Endo/Heme/Allergies: Negative.    Psychiatric/Behavioral: Negative.          PAST MEDICAL HISTORY:  Past Medical History:   Diagnosis Date    Elevated PSA     Hypertension     Malignant neoplasm of prostate        PAST SURGICAL HISTORY:  Past Surgical History:   Procedure Laterality Date    BIOPSY WITH TRANSRECTAL ULTRASOUND (TRUS) GUIDANCE Bilateral 04/11/2019    [place] 4+3=7       SOCIAL HISTORY:  Social History     Socioeconomic History    Marital status: Single   Tobacco Use    Smoking status: Never     Passive exposure: Never    Smokeless tobacco: Never   Substance and Sexual Activity    Alcohol use: Never    Drug use: Never    Sexual activity: Not Currently       FAMILY HISTORY:  Family History   Problem Relation Age of Onset    Hypertension Father     Hypertension Mother     Diabetes Mother     Prostate cancer Brother     Cervical cancer Daughter     Hypertension Daughter     Lupus Son        ALLERGIES:  Allergies as of 09/19/2023 - Reviewed  "09/19/2023   Allergen Reaction Noted    Aspirin  04/19/2021         MEDICATIONS:  Current Outpatient Medications on File Prior to Visit   Medication Sig Dispense Refill    amLODIPine (NORVASC) 5 MG tablet Take 1 tablet (5 mg total) by mouth 2 (two) times daily. 180 tablet 0    ascorbic acid, vitamin C, (VITAMIN C) 1000 MG tablet Take 1,000 mg by mouth once daily.      atorvastatin (LIPITOR) 20 MG tablet Take 1 tablet (20 mg total) by mouth every evening. 90 tablet 0    losartan (COZAAR) 50 MG tablet Take 1 tablet (50 mg total) by mouth once daily. 90 tablet 1    labetaloL (NORMODYNE) 100 MG tablet Take 1 tablet (100 mg total) by mouth 2 (two) times daily. 180 tablet 0     No current facility-administered medications on file prior to visit.          PHYSICAL EXAM:  Blood pressure 128/72, pulse 68, height 5' 10" (1.778 m), weight 57.2 kg (126 lb), SpO2 98 %.  Wt Readings from Last 3 Encounters:   09/19/23 57.2 kg (126 lb)   08/17/23 58.1 kg (128 lb)   08/14/23 58.4 kg (128 lb 11.2 oz)      Body mass index is 18.08 kg/m².    Physical Exam  Constitutional:       Appearance: Normal appearance. He is normal weight.   HENT:      Head: Normocephalic and atraumatic.      Right Ear: External ear normal.      Left Ear: External ear normal.      Mouth/Throat:      Mouth: Mucous membranes are moist.   Eyes:      Extraocular Movements: Extraocular movements intact.      Conjunctiva/sclera: Conjunctivae normal.      Pupils: Pupils are equal, round, and reactive to light.   Neck:      Vascular: Carotid bruit present.   Cardiovascular:      Rate and Rhythm: Regular rhythm. Bradycardia present.      Pulses: Normal pulses.      Heart sounds: No murmur heard.  Pulmonary:      Effort: Pulmonary effort is normal.      Breath sounds: Normal breath sounds.   Abdominal:      General: Abdomen is flat. Bowel sounds are normal.      Palpations: Abdomen is soft.   Musculoskeletal:         General: Normal range of motion.   Skin:     General: " Skin is warm and dry.   Neurological:      General: No focal deficit present.      Mental Status: He is alert.          LABS REVIEWED:  Lab Results   Component Value Date    WBC 5.22 08/14/2023    RBC 3.72 (L) 08/14/2023    HGB 11.5 (L) 08/14/2023    HCT 34.8 (L) 08/14/2023    MCV 93.5 08/14/2023    MCH 30.9 08/14/2023    MCHC 33.0 08/14/2023    RDW 12.8 08/14/2023     08/14/2023    MPV 11.8 08/14/2023    NRBC 0.0 08/14/2023     Lab Results   Component Value Date     08/14/2023    K 3.6 08/14/2023     (H) 08/14/2023    CO2 27 08/14/2023    BUN 16 08/14/2023    MG 2.5 (H) 08/14/2023     Lab Results   Component Value Date    AST 20 08/14/2023    ALT 28 08/14/2023     Lab Results   Component Value Date    GLU 99 08/14/2023     Lab Results   Component Value Date    CHOL 134 09/12/2022    HDL 64 (H) 09/12/2022    TRIG 86 09/12/2022    CHOLHDL 2.1 09/12/2022       CARDIAC STUDIES REVIEWED:    OTHER IMAGING STUDIES REVIEWED:        ASSESSMENT:   Essential (primary) hypertension    Hyperlipidemia, unspecified hyperlipidemia type    Bradycardia          PLAN:  1. Bradycardia: mild sinus joey on Zio, short runs of asymptomatic SVT , up to four beats, no intervention indicated. 2. Fatigue, unclear etiology, will order echo to eval LV function  3. Hypertension: much better controlled off Normodyne, on Losartin 50 mg q d.   4. Hyperlipidemia: on Lipitor, at goal on lipid panel.       No aspirin due to true allergy       Follow up six months, sooner if symptoms change.

## 2023-10-27 ENCOUNTER — HOSPITAL ENCOUNTER (EMERGENCY)
Facility: HOSPITAL | Age: 88
Discharge: HOME | End: 2023-10-27
Payer: MEDICARE

## 2023-10-27 VITALS
SYSTOLIC BLOOD PRESSURE: 148 MMHG | BODY MASS INDEX: 20.46 KG/M2 | DIASTOLIC BLOOD PRESSURE: 78 MMHG | OXYGEN SATURATION: 98 % | WEIGHT: 135 LBS | HEART RATE: 61 BPM | RESPIRATION RATE: 18 BRPM | HEIGHT: 68 IN | TEMPERATURE: 98 F

## 2023-10-27 DIAGNOSIS — M79.675 TOE PAIN, LEFT: ICD-10-CM

## 2023-10-27 DIAGNOSIS — L97.529 SKIN ULCER OF LEFT GREAT TOE, UNSPECIFIED ULCER STAGE: Primary | ICD-10-CM

## 2023-10-27 DIAGNOSIS — M20.5X2 CONTRACTURE OF TOE OF LEFT FOOT: ICD-10-CM

## 2023-10-27 LAB
ANION GAP SERPL CALCULATED.3IONS-SCNC: 10 MMOL/L (ref 7–16)
BASOPHILS # BLD AUTO: 0.02 K/UL (ref 0–0.2)
BASOPHILS NFR BLD AUTO: 0.5 % (ref 0–1)
BUN SERPL-MCNC: 15 MG/DL (ref 7–18)
BUN/CREAT SERPL: 16 (ref 6–20)
CALCIUM SERPL-MCNC: 9.9 MG/DL (ref 8.5–10.1)
CHLORIDE SERPL-SCNC: 105 MMOL/L (ref 98–107)
CO2 SERPL-SCNC: 31 MMOL/L (ref 21–32)
CREAT SERPL-MCNC: 0.93 MG/DL (ref 0.7–1.3)
DIFFERENTIAL METHOD BLD: ABNORMAL
EGFR (NO RACE VARIABLE) (RUSH/TITUS): 80 ML/MIN/1.73M2
EOSINOPHIL # BLD AUTO: 0.25 K/UL (ref 0–0.5)
EOSINOPHIL NFR BLD AUTO: 5.7 % (ref 1–4)
ERYTHROCYTE [DISTWIDTH] IN BLOOD BY AUTOMATED COUNT: 12.4 % (ref 11.5–14.5)
GLUCOSE SERPL-MCNC: 92 MG/DL (ref 74–106)
HCT VFR BLD AUTO: 38.3 % (ref 40–54)
HGB BLD-MCNC: 12.4 G/DL (ref 13.5–18)
LYMPHOCYTES # BLD AUTO: 1.38 K/UL (ref 1–4.8)
LYMPHOCYTES NFR BLD AUTO: 31.5 % (ref 27–41)
MCH RBC QN AUTO: 30.6 PG (ref 27–31)
MCHC RBC AUTO-ENTMCNC: 32.4 G/DL (ref 32–36)
MCV RBC AUTO: 94.6 FL (ref 80–96)
MONOCYTES # BLD AUTO: 0.5 K/UL (ref 0–0.8)
MONOCYTES NFR BLD AUTO: 11.4 % (ref 2–6)
MPC BLD CALC-MCNC: 10.4 FL (ref 9.4–12.4)
NEUTROPHILS # BLD AUTO: 2.23 K/UL (ref 1.8–7.7)
NEUTROPHILS NFR BLD AUTO: 50.9 % (ref 53–65)
PLATELET # BLD AUTO: 165 K/UL (ref 150–400)
POTASSIUM SERPL-SCNC: 4.6 MMOL/L (ref 3.5–5.1)
RBC # BLD AUTO: 4.05 M/UL (ref 4.6–6.2)
SODIUM SERPL-SCNC: 141 MMOL/L (ref 136–145)
WBC # BLD AUTO: 4.38 K/UL (ref 4.5–11)

## 2023-10-27 PROCEDURE — 85025 COMPLETE CBC W/AUTO DIFF WBC: CPT | Performed by: NURSE PRACTITIONER

## 2023-10-27 PROCEDURE — 99284 EMERGENCY DEPT VISIT MOD MDM: CPT

## 2023-10-27 PROCEDURE — 99284 EMERGENCY DEPT VISIT MOD MDM: CPT | Mod: GF | Performed by: NURSE PRACTITIONER

## 2023-10-27 PROCEDURE — 80048 BASIC METABOLIC PNL TOTAL CA: CPT | Performed by: NURSE PRACTITIONER

## 2023-10-27 RX ORDER — SULFAMETHOXAZOLE AND TRIMETHOPRIM 800; 160 MG/1; MG/1
1 TABLET ORAL 2 TIMES DAILY
Qty: 20 TABLET | Refills: 0 | Status: SHIPPED | OUTPATIENT
Start: 2023-10-27 | End: 2023-11-06

## 2023-10-27 NOTE — ED PROVIDER NOTES
Encounter Date: 10/27/2023       History     Chief Complaint   Patient presents with    Toe Pain     Presented with c/o pain to left 3rd, 4th, and 5th toes that started 2 days ago. Denies injury. States noticed discoloration of those toes and sore to 4th toe approx 3 mo ago. Has not discussed with his PCP. Denies fever or chills or open wound with drng. PMH of HTN, hyperlipidemia. Denies history of DM or known PVD or neuropathy. Reports history of injury with chain saw in the past.      Review of patient's allergies indicates:   Allergen Reactions    Aspirin      Past Medical History:   Diagnosis Date    Elevated PSA     Hypertension     Malignant neoplasm of prostate      Past Surgical History:   Procedure Laterality Date    BIOPSY WITH TRANSRECTAL ULTRASOUND (TRUS) GUIDANCE Bilateral 04/11/2019    [place] 4+3=7     Family History   Problem Relation Age of Onset    Hypertension Father     Hypertension Mother     Diabetes Mother     Prostate cancer Brother     Cervical cancer Daughter     Hypertension Daughter     Lupus Son      Social History     Tobacco Use    Smoking status: Never     Passive exposure: Never    Smokeless tobacco: Never   Substance Use Topics    Alcohol use: Never    Drug use: Never     Review of Systems   Constitutional:  Negative for activity change, appetite change and fever.   HENT: Negative.     Respiratory: Negative.     Cardiovascular: Negative.    Genitourinary:  Negative for difficulty urinating.   Musculoskeletal:  Positive for arthralgias (left toes). Negative for gait problem.   Neurological: Negative.  Negative for weakness.   Psychiatric/Behavioral: Negative.         Physical Exam     Initial Vitals [10/27/23 1625]   BP Pulse Resp Temp SpO2   (!) 168/83 63 16 98.3 °F (36.8 °C) 98 %      MAP       --         Physical Exam    Nursing note and vitals reviewed.  Constitutional: He appears well-developed and well-nourished. No distress.   HENT:   Head: Normocephalic.   Right Ear: External  If she needs it right now, she should ask the attending at the hospital.  If she does not need it until discharge, then she may bring it with her when we see the patient for TCM visit.   ear normal.   Left Ear: External ear normal.   Nose: Nose normal.   Mouth/Throat: Oropharynx is clear and moist.   Eyes: Conjunctivae and EOM are normal.   Neck: Neck supple.   Normal range of motion.  Cardiovascular:  Normal rate, regular rhythm, normal heart sounds and intact distal pulses.           Pulmonary/Chest: Breath sounds normal. He has no wheezes. He has no rhonchi. He has no rales.   Abdominal: Abdomen is soft. Bowel sounds are normal. There is no abdominal tenderness.   Musculoskeletal:         General: Tenderness (left 3rd, 4th, and 5th toes) present. Normal range of motion.      Cervical back: Normal range of motion and neck supple.     Neurological: He is alert and oriented to person, place, and time. No sensory deficit. GCS score is 15. GCS eye subscore is 4. GCS verbal subscore is 5. GCS motor subscore is 6.   Skin: Skin is warm and dry. Capillary refill takes less than 2 seconds. No erythema.   Dry ulceration right 4th toe.           Medical Screening Exam   See Full Note    ED Course   Procedures  Labs Reviewed   CBC WITH DIFFERENTIAL - Abnormal; Notable for the following components:       Result Value    WBC 4.38 (*)     RBC 4.05 (*)     Hemoglobin 12.4 (*)     Hematocrit 38.3 (*)     Neutrophils % 50.9 (*)     Monocytes % 11.4 (*)     Eosinophils % 5.7 (*)     All other components within normal limits   BASIC METABOLIC PANEL - Normal   CBC W/ AUTO DIFFERENTIAL    Narrative:     The following orders were created for panel order CBC auto differential.  Procedure                               Abnormality         Status                     ---------                               -----------         ------                     CBC with Differential[[id number]]       Abnormal            Final result                 Please view results for these tests on the individual orders.          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    COMPARISON:  No previous similar    TECHNIQUE:  AP, lateral, and oblique views left toes    FINDINGS:  There is osteopenia.  There is no acute fracture or dislocation.  There is extension deformity of the MTP joints of the 3rd through 5th digits and flexion deformity of the PIP joints of the same digits.    There is some soft tissue ossification adjacent to the base of the 5th metatarsal which may be the result of some remote posttraumatic soft tissue injury.                                       Medications - No data to display  Medical Decision Making  Presented with c/o pain to left 3rd, 4th, and 5th toes that started 2 days ago. Denies injury. States noticed discoloration of those toes and sore to 4th toe approx 3 mo ago. Has not discussed with his PCP. Denies fever or chills or open wound with drng. Denies decrease in sensation. PMH of HTN, hyperlipidemia. Denies history of DM or known PVD or neuropathy. Reports history of injury with chain saw in the past which has caused deformity.     Amount and/or Complexity of Data Reviewed  Labs: ordered. Decision-making details documented in ED Course.     Details: WBC 4380.  Radiology: ordered.     Details: Xray left toes shows no acute fracture.   Flexion/extension deformities of the 3rd through 5th toes    Risk  Prescription drug management.  Risk Details: Pt is afebrile. No open wound to toes. Dry ulceration noted to 4th toe. Rx for Bactrim DS. Referral to wound care. Instructed pt and sister on importance of follow-up. Discharged home with detailed instructions provided.               ED  Course as of 10/28/23 0002   Fri Oct 27, 2023   1715 WBC(!): 4.38 [DP]   1715 Hemoglobin(!): 12.4 [DP]   1715 Hematocrit(!): 38.3 [DP]   1715 Platelet Count: 165 [DP]      ED Course User Index  [DP] Alma Chen NP                    Clinical Impression:   Final diagnoses:  [M79.675] Toe pain, left  [M20.5X2] Contracture of toe of left foot  [L97.529] Skin ulcer of left great toe, unspecified ulcer stage (Primary)        ED Disposition Condition    Discharge Stable          ED Prescriptions       Medication Sig Dispense Start Date End Date Auth. Provider    sulfamethoxazole-trimethoprim 800-160mg (BACTRIM DS) 800-160 mg Tab Take 1 tablet by mouth 2 (two) times daily. for 10 days 20 tablet 10/27/2023 11/6/2023 Alma Chen NP          Follow-up Information       Follow up With Specialties Details Why Contact Info    Jeanette Mc, FNP Family Medicine In 3 days  [address] [place] [address]  [facility] [place] [address]  [phone]      [facility]   Office staff should call you with an appointment next week              Alma Chen NP  10/28/23 0002

## 2023-10-27 NOTE — DISCHARGE INSTRUCTIONS
Take Bactrim as prescribed for all 10 days. Wound care nurse should call you with an appointment next week. Follow-up with Ms. Alexandro NP on Monday. Return to the ED for worsening symptoms.

## 2023-10-30 ENCOUNTER — OFFICE VISIT (OUTPATIENT)
Dept: UROLOGY | Facility: CLINIC | Age: 88
End: 2023-10-30
Payer: MEDICARE

## 2023-10-30 VITALS
WEIGHT: 130 LBS | HEIGHT: 70 IN | BODY MASS INDEX: 18.61 KG/M2 | SYSTOLIC BLOOD PRESSURE: 153 MMHG | HEART RATE: 71 BPM | DIASTOLIC BLOOD PRESSURE: 79 MMHG

## 2023-10-30 DIAGNOSIS — C61 MALIGNANT NEOPLASM OF PROSTATE: Primary | ICD-10-CM

## 2023-10-30 DIAGNOSIS — I10 ESSENTIAL HYPERTENSION: ICD-10-CM

## 2023-10-30 DIAGNOSIS — R97.20 ELEVATED PSA: ICD-10-CM

## 2023-10-30 DIAGNOSIS — N41.1 CHRONIC PROSTATITIS: ICD-10-CM

## 2023-10-30 DIAGNOSIS — N32.0 BLADDER OUTLET OBSTRUCTION: ICD-10-CM

## 2023-10-30 PROCEDURE — 99214 OFFICE O/P EST MOD 30 MIN: CPT | Mod: PBBFAC | Performed by: UROLOGY

## 2023-10-30 PROCEDURE — 99213 PR OFFICE/OUTPT VISIT, EST, LEVL III, 20-29 MIN: ICD-10-PCS | Mod: S$PBB,,, | Performed by: UROLOGY

## 2023-10-30 PROCEDURE — 99213 OFFICE O/P EST LOW 20 MIN: CPT | Mod: S$PBB,,, | Performed by: UROLOGY

## 2023-10-30 NOTE — PROGRESS NOTES
Subjective     Patient ID: Treasure Madsen is a 88 y.o. male.    Chief Complaint: Follow-up (Six month PSA and visit. )    Prostate Cancer Follow-up  2019 17:57  Test comment: right and left prostate bx's      SURGICAL PATHOLOGY REPORT      PATIENT: TREASURE MADSEN CASE NUMBER: [id number]    1935 AGE: 84 yrs SEX: M ACCOUNT NUMBER: [id number]   RACE: Black UNIT NUMBER: [id number]   ATTENDING DATE COLLECTED: 2019   Irvin Chan M.D.   PHYSICIAN:   DATE RECEIVED: 2019   DATE REPORTED: 2019         DIAGNOSIS:   A. 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 Prostate, left midline, biopsies:   - PROSTATIC ADENOCARCINOMA, BRYAN SCORE 3+4=7, INVOLVING   APPROXIMATELY 15% OF SUBMITTED TISSUE   - High-grade prostatic intraepithelial neoplasia (HGPIN)   C. Prostate, right lateral, biopsy:   - Prostate tissue with a small focus of atypical glands   - High-grade prostatic intraepithelial neoplasia (HGPIN)   D. Prostate, right midline, biopsies:   - High-grade prostatic intraepithelial neoplasia (HGPIN)   - Chronic inflammation      COMMENTS: The diagnosis of carcinoma is supported by the failure of immunohistochemistry for high molecular weight cytokeratin to demonstrate basal cells in the atypical   glands (performed on parts A, B, C, and D). The atypical glands in part C appear to lack basal cells and are suspicious for malignancy, but do not meet the   cytological and architectural threshold for a diagnosis of malignancy. Dr. Gonzales has reviewed selected slides and agrees with the interpretation.      Of note, this diagnosis places this patient in prognostic grade group 2 (out of 5) of a newly proposed consensus grading scheme (see reference below).      Reference: Diego et al. The 2014 International Society of Urological Pathology (ISUP)  Consensus Conference   on [place] Grading of Prostatic Carcinoma: Definition of Grading Patterns and Proposal for a New Grading System. Am J Surg Pathol. 2016 Feb;40(2):244-52.      MICROSCOPIC DESCRIPTION:   A microscopic examination has been performed.      Clinical History: Elevated PSA, PSA 10.800      Electronically Signed By:      Aaron Early MD   04/12/2019 14:32  ---------------------------     Urology History  Associated Symptoms:  Edema  Mr. Madsen is 84 years old. He was sent by Ms. Mc because of PSA elevation. He had a very high normal PSA in 2012 but none between then and last year. Patient has mild  bladder outlet obstructive symptoms with IPSS score of 2, and does not feel he needs any help with the way he is voiding. Nocturia x2. No family history of prostate cancer. No history of urinary tract infections, hematuria, or any other previous  problems. Patient in with his daughter for follow-up of PSA elevation.  (February 7, 2019)     Patient returned for follow-up of PSA elevation to have another PSA drawn and for consideration of prostate biopsies. Patient in with his 2 daughters today. Stable with voiding without significant lower tract obstructive symptoms.  (April 11, 2019)     Mr. Madsen is in for first visit since his biopsies were made. I had recommended that he be managed conservatively because of his age. He was found to have prostate cancer on the biopsies and we had talked with his daughter about situation. Patient is doing well clinically and feels just as well as he did last time he was here when he had his biopsies.  He did not have his PSA drawn before he was seen. Patient in with his daughter, Kelley. (October 14, 2019)     Mr. Narayan has not been seen in nearly 1 year. He was in again today with his daughter Kelley. Patient feels he is voiding okay. No burning on urination and no visible blood in the urine. He has not had any hospitalizations or major health  problems since he was last seen in 11 months ago. Last appointment delayed because of the coronavirus problem.  (September 16, 2020)     PSA Results:  Past PSA Results   PSA was 14.400 on April 19, 2021  PSA  was 17.000 on 9/16/2020  PSA was 12.300 on October 14, 2019  PSA was 10.800 on April 11, 2019  PSA was 9.760 on 11/16/2018  PSA was 11.300 on 8/15/2018  PSA was 3.86 on October 9, 2012  ------------------------------------------------------------------------------------------------------------------------------------------------------------------------------------------------------------------------------------------------------------------------------------------------------------------------PSA was 16.500 on October 20, 2021  PSA was 19.800 on April 25, 2022  PSA was 20.000 on October 25, 2022  PSA was 21.300 on April 25, 2023  PSA was 24.200 on August 14, 2023     All the above notes are from the old clinic system except for the PSA done today which is down slightly from last time.  Patient doing okay clinically.  Nocturia x2 with no worsening bladder outlet obstructive symptoms.  He has actually gained 6 lb since last visit.  Patient in with his daughter. (April 19, 2021)     Mr. Madsen is in for 6 month follow-up of prostate cancer.  He had his PSA done and results are pending.  No new health issues that he is aware of.  8 lb weight loss since last visit.  he had gained 6 pounds last time.  Patient in with his daughter Jadyn.  (October 20, 2021)     Mr. Narayan is in for his 6 month follow-up for prostate cancer.  PSA has been drawn and is pending.  No worsening bladder outlet obstructive symptoms or any other  problems.  He had to have a trip to the emergency room in November because of a fall injuring his head but otherwise has had a good 6 months.  PSA pending.  (April 25, 2022)     Mr. Madsen is in for his 6 month follow-up of prostate cancer.  He has had a good 6 months in general without any  worsening bladder outlet obstructive symptoms or any other health problems.  Continues to have nocturia 1-2 times nightly.  He has had no additional falls or any other problems.  Overall good 6 months.  PSA drawn and results pending.  [October 25, 2022]     Mr. Madsen is in for six-month follow-up of prostate cancer.  He has had no new health issues since he was here last time.  2 lb weight loss.  He still has nocturia a couple times nightly.  PSA drawn and results pending.  Patient in today with his daughter Jadyn. [April 25, 2023]    Mr. Madsen is in for six-month follow-up of prostate cancer.  PSA in August was up slightly to 24.200.  No problems with urination.  Had to come to the emergency room Friday because of an ulcer on his left foot that is presumably related to peripheral vascular disease but may have also rubbed it with a shoe.  He has had a 6 lb weight loss in the past 6 months.  No new issues otherwise. [October 30, 2023]         Review of Systems       Objective     Physical Exam  Constitutional:       Appearance: Normal appearance. He is normal weight.   Neurological:      Mental Status: He is alert.   Psychiatric:         Mood and Affect: Mood normal.         Behavior: Behavior normal.     Urinalysis unremarkable with only occasional pus cells noted.  The dipstick had a trace of blood with pH of 6.0 and specific gravity 1.015     Assessment and Plan     1. Malignant neoplasm of prostate  -     PSA, Total (Diagnostic); Future; Expected date: 04/30/2024    2. Elevated PSA    3. Chronic prostatitis    4. Bladder outlet obstruction    5. Essential hypertension        PSA of 24.200 in August reported to patient.  Recommend continued conservative treatment for slowly rising PSA.  Six-month appointment with PSA.        Follow up in 6 months (on 4/30/2024) for 6 month visit, PSA lab prior to visit with Dr. Irvin Chan.

## 2023-10-31 ENCOUNTER — OFFICE VISIT (OUTPATIENT)
Dept: FAMILY MEDICINE | Facility: CLINIC | Age: 88
End: 2023-10-31
Payer: MEDICARE

## 2023-10-31 VITALS
SYSTOLIC BLOOD PRESSURE: 153 MMHG | DIASTOLIC BLOOD PRESSURE: 75 MMHG | HEART RATE: 64 BPM | OXYGEN SATURATION: 99 % | BODY MASS INDEX: 18.78 KG/M2 | WEIGHT: 131.19 LBS | HEIGHT: 70 IN

## 2023-10-31 DIAGNOSIS — E78.5 HYPERLIPIDEMIA, UNSPECIFIED HYPERLIPIDEMIA TYPE: ICD-10-CM

## 2023-10-31 DIAGNOSIS — R00.1 BRADYCARDIA: ICD-10-CM

## 2023-10-31 DIAGNOSIS — M79.675 TOE PAIN, LEFT: ICD-10-CM

## 2023-10-31 DIAGNOSIS — M20.5X2 CONTRACTURE OF TOE OF LEFT FOOT: ICD-10-CM

## 2023-10-31 DIAGNOSIS — I10 ESSENTIAL (PRIMARY) HYPERTENSION: ICD-10-CM

## 2023-10-31 DIAGNOSIS — L84 CALLUS OF TOE: Primary | ICD-10-CM

## 2023-10-31 PROCEDURE — 99214 OFFICE O/P EST MOD 30 MIN: CPT | Mod: ,,, | Performed by: NURSE PRACTITIONER

## 2023-10-31 PROCEDURE — 99214 PR OFFICE/OUTPT VISIT, EST, LEVL IV, 30-39 MIN: ICD-10-PCS | Mod: ,,, | Performed by: NURSE PRACTITIONER

## 2023-10-31 RX ORDER — AMLODIPINE BESYLATE 5 MG/1
5 TABLET ORAL 2 TIMES DAILY
Qty: 180 TABLET | Refills: 0 | Status: SHIPPED | OUTPATIENT
Start: 2023-10-31 | End: 2024-01-23 | Stop reason: SDUPTHER

## 2023-10-31 RX ORDER — ATORVASTATIN CALCIUM 20 MG/1
20 TABLET, FILM COATED ORAL NIGHTLY
Qty: 90 TABLET | Refills: 0 | Status: SHIPPED | OUTPATIENT
Start: 2023-10-31 | End: 2024-01-23 | Stop reason: SDUPTHER

## 2023-10-31 NOTE — PATIENT INSTRUCTIONS
PSA of 24.200 in August reported to patient.  Recommend continued conservative treatment for slowly rising PSA.  Six-month appointment with PSA.

## 2023-10-31 NOTE — PATIENT INSTRUCTIONS
Keep appointment as scheduled with wound care at [facility]    Continue bactrim as prescribed by ED    If you decide you want home health services please contact us here at clinic     Refills provided on routine medications

## 2023-10-31 NOTE — PROGRESS NOTES
Clinic note     Patient name: Gavin Madsen is a 88 y.o. male   Chief compliant   Chief Complaint   Patient presents with    Follow-up     From Ed visit for wound, states just recd a wound care appt for Thursday morning.        Subjective     History of present illness   In clinic for follow up r/t ED visit on 10/27/2023 with discharge diagnosis: toe pain, contracture of toe of left foot, skin ulcer to left great toe, treated with bactrim x ten days  He has wound care appointment scheduled at [facility] on Thursday, 11/2/23    Discussed home health referral, he declines referral at this time     Past Medical History: HTN, bradycardia, prostate cancer      Urology: Dr Chan, last clinic note reviewed   Cardiology: Dr Glez, last clinic note reviewed     Family present during office visit today         Social History     Tobacco Use    Smoking status: Never     Passive exposure: Never    Smokeless tobacco: Never   Substance Use Topics    Alcohol use: Never    Drug use: Never       Review of patient's allergies indicates:   Allergen Reactions    Aspirin        Past Medical History:   Diagnosis Date    Elevated PSA     Hypertension     Malignant neoplasm of prostate        Past Surgical History:   Procedure Laterality Date    BIOPSY WITH TRANSRECTAL ULTRASOUND (TRUS) GUIDANCE Bilateral 04/11/2019    [place] 4+3=7        Family History   Problem Relation Age of Onset    Hypertension Father     Hypertension Mother     Diabetes Mother     Prostate cancer Brother     Cervical cancer Daughter     Hypertension Daughter     Lupus Son          Current Outpatient Medications:     ascorbic acid, vitamin C, (VITAMIN C) 1000 MG tablet, Take 1,000 mg by mouth once daily., Disp: , Rfl:     losartan (COZAAR) 50 MG tablet, Take 1 tablet (50 mg total) by mouth once daily., Disp: 90 tablet, Rfl: 1    sulfamethoxazole-trimethoprim 800-160mg (BACTRIM DS) 800-160 mg Tab, Take 1 tablet by mouth 2 (two) times daily. for 10 days, Disp: 20 tablet, Rfl:  "0    amLODIPine (NORVASC) 5 MG tablet, Take 1 tablet (5 mg total) by mouth 2 (two) times daily., Disp: 180 tablet, Rfl: 0    atorvastatin (LIPITOR) 20 MG tablet, Take 1 tablet (20 mg total) by mouth every evening., Disp: 90 tablet, Rfl: 0    Review of Systems   Constitutional:  Negative for activity change, appetite change, chills, fatigue, fever and unexpected weight change.   Respiratory:  Negative for cough and shortness of breath.    Cardiovascular:  Negative for chest pain, palpitations and leg swelling.   Gastrointestinal:  Negative for abdominal pain, blood in stool, change in bowel habit, constipation, diarrhea, nausea and vomiting.   Genitourinary:  Negative for difficulty urinating and dysuria.   Musculoskeletal:  Negative for arthralgias, gait problem and myalgias.   Integumentary:  Positive for wound (callus left 4th toe).   Neurological:  Negative for dizziness, light-headedness, headaches and coordination difficulties.   Psychiatric/Behavioral:  Negative for confusion, dysphoric mood and sleep disturbance. The patient is not nervous/anxious.        Objective     BP (!) 153/75   Pulse 64   Ht 5' 10" (1.778 m)   Wt 59.5 kg (131 lb 3.2 oz)   SpO2 99%   BMI 18.83 kg/m²     Physical Exam   Constitutional: He is oriented to person, place, and time. normal appearance. No distress.   HENT:   Head: Atraumatic.   Mouth/Throat: Mucous membranes are moist.   Cardiovascular: Regular rhythm. Bradycardia present. Pulmonary:      Effort: Pulmonary effort is normal. No respiratory distress.      Breath sounds: Normal breath sounds. No wheezing, rhonchi or rales.     Abdominal: Soft. Normal appearance and bowel sounds are normal. He exhibits no distension. There is no abdominal tenderness.   Musculoskeletal:         General: Normal range of motion.      Cervical back: Neck supple.      Right lower leg: No edema.      Left lower leg: No edema.   Neurological: He is alert and oriented to person, place, and time. Gait " normal.   Skin: Skin is warm and dry.   Psychiatric: His behavior is normal. Mood normal.     EXAMINATION:  XR TOE 2 OR MORE VIEWS LEFT     CLINICAL HISTORY:  pain to left 3rd, 4th, and 5th toes;.     COMPARISON:  No previous similar     TECHNIQUE:  AP, lateral, and oblique views left toes     FINDINGS:  There is osteopenia.  There is no acute fracture or dislocation.  There is extension deformity of the MTP joints of the 3rd through 5th digits and flexion deformity of the PIP joints of the same digits.     There is some soft tissue ossification adjacent to the base of the 5th metatarsal which may be the result of some remote posttraumatic soft tissue injury.     Impression:     No acute fracture     Flexion/extension deformities of the 3rd through 5th toes        Electronically signed by: Hector Sanders  Date:                                            10/27/2023  Time:                                           17:45           Exam Ended: 10/27/23 16:54 Last Resulted: 10/27/23 17:45              Lab Results   Component Value Date    WBC 4.38 (L) 10/27/2023    HGB 12.4 (L) 10/27/2023    HCT 38.3 (L) 10/27/2023    MCV 94.6 10/27/2023     10/27/2023       CMP  Sodium   Date Value Ref Range Status   10/27/2023 141 136 - 145 mmol/L Final     Potassium   Date Value Ref Range Status   10/27/2023 4.6 3.5 - 5.1 mmol/L Final     Chloride   Date Value Ref Range Status   10/27/2023 105 98 - 107 mmol/L Final     CO2   Date Value Ref Range Status   10/27/2023 31 21 - 32 mmol/L Final     Glucose   Date Value Ref Range Status   10/27/2023 92 74 - 106 mg/dL Final     BUN   Date Value Ref Range Status   10/27/2023 15 7 - 18 mg/dL Final     Creatinine   Date Value Ref Range Status   10/27/2023 0.93 0.70 - 1.30 mg/dL Final     Calcium   Date Value Ref Range Status   10/27/2023 9.9 8.5 - 10.1 mg/dL Final     Total Protein   Date Value Ref Range Status   08/14/2023 7.0 6.4 - 8.2 g/dL Final     Albumin   Date Value Ref Range  "Status   08/14/2023 3.9 3.5 - 5.0 g/dL Final     Bilirubin, Total   Date Value Ref Range Status   08/14/2023 0.7 >0.0 - 1.2 mg/dL Final     Alk Phos   Date Value Ref Range Status   08/14/2023 65 45 - 115 U/L Final     AST   Date Value Ref Range Status   08/14/2023 20 15 - 37 U/L Final     ALT   Date Value Ref Range Status   08/14/2023 28 16 - 61 U/L Final     Anion Gap   Date Value Ref Range Status   10/27/2023 10 7 - 16 mmol/L Final     eGFR    Date Value Ref Range Status   11/12/2021 110 >=60 mL/min/1.73m² Final     eGFR   Date Value Ref Range Status   04/04/2022 78 >=60 mL/min/1.73m² Final     No results found for: "TSH"  Lab Results   Component Value Date    CHOL 134 09/12/2022    CHOL 117 07/16/2021     Lab Results   Component Value Date    HDL 64 (H) 09/12/2022    HDL 63 (H) 07/16/2021     Lab Results   Component Value Date    LDLCALC 53 09/12/2022    LDLCALC 38 07/16/2021     Lab Results   Component Value Date    TRIG 86 09/12/2022    TRIG 78 07/16/2021     Lab Results   Component Value Date    CHOLHDL 2.1 09/12/2022    CHOLHDL 1.9 07/16/2021     No results found for: "LABA1C", "HGBA1C"      Assessment and Plan   Toe pain, left    Contracture of toe of left foot    Essential (primary) hypertension  -     amLODIPine (NORVASC) 5 MG tablet; Take 1 tablet (5 mg total) by mouth 2 (two) times daily.  Dispense: 180 tablet; Refill: 0    Bradycardia    Hyperlipidemia, unspecified hyperlipidemia type  -     atorvastatin (LIPITOR) 20 MG tablet; Take 1 tablet (20 mg total) by mouth every evening.  Dispense: 90 tablet; Refill: 0          Patient Instructions  Patient Instructions   Keep appointment as scheduled with wound care at [facility]    Continue bactrim as prescribed by ED    If you decide you want home health services please contact us here at clinic     Refills provided on routine medications                             "

## 2023-11-02 ENCOUNTER — CLINICAL SUPPORT (OUTPATIENT)
Dept: WOUND CARE | Facility: HOSPITAL | Age: 88
End: 2023-11-02
Attending: NURSE PRACTITIONER
Payer: MEDICARE

## 2023-11-02 VITALS
SYSTOLIC BLOOD PRESSURE: 140 MMHG | RESPIRATION RATE: 16 BRPM | TEMPERATURE: 98 F | DIASTOLIC BLOOD PRESSURE: 75 MMHG | HEART RATE: 67 BPM

## 2023-11-02 DIAGNOSIS — L89.892 PRESSURE INJURY OF TOE OF LEFT FOOT, STAGE 2: Primary | ICD-10-CM

## 2023-11-02 PROCEDURE — 99213 OFFICE O/P EST LOW 20 MIN: CPT

## 2023-11-02 NOTE — PROGRESS NOTES
NEW CLINIC VISIT          Patient Name: Gavin Madsen is a 88 y.o. male       Chief Complaint:  New Wound Care Visit    Review of patient's allergies indicates:   Allergen Reactions    Aspirin         I have reviewed the patient's medical, surgical, family and social history.      Subjective:    HPI     Wound Location: left 4th toe    Wound Duration: onset 11/2/21    Wound Context: pressure    Wound Pain: mild    Associated S/S: callus/ darkening of skin of toe    89 YO AAM seen today for initial evaluation. Daughter is present with patient, and is very concerned due to darkening of toe.         Review of Systems   Constitutional:  Negative for appetite change and fever.   Respiratory:  Negative for shortness of breath.    Cardiovascular:  Negative for chest pain, leg swelling and claudication.   Musculoskeletal:  Negative for leg pain.   Integumentary:  Positive for color change and wound. Negative for rash.   Neurological:  Negative for weakness and numbness.   Hematological:  Does not bruise/bleed easily.   Psychiatric/Behavioral:  The patient is not nervous/anxious.               Physical Exam  Vitals reviewed.   Constitutional:       Appearance: Normal appearance.   HENT:      Head: Normocephalic and atraumatic.      Right Ear: External ear normal.      Left Ear: External ear normal.   Cardiovascular:      Rate and Rhythm: Normal rate and regular rhythm.      Pulses:           Dorsalis pedis pulses are 2+ on the right side and 2+ on the left side.   Pulmonary:      Effort: Pulmonary effort is normal.      Breath sounds: Normal breath sounds.   Musculoskeletal:         General: Normal range of motion.      Right lower leg: No edema.      Left lower leg: No edema.        Feet:    Feet:      Right foot:      Skin integrity: Dry skin present.      Left foot:      Skin integrity: Dry skin present.      Comments: Darkening of skin to 4th toe- appears to be hyperkeratosis  Skin:     General: Skin is warm and dry.       Findings: Wound present.   Neurological:      Mental Status: He is alert.          Please see Wound Docs for complete Wound Assessment and lower extremity assessment when applicable.    Documentation of any procedures can be viewed in Wound Docs if applicable.         Assessment and Plan:    1. Pressure injury of toe of left foot, stage 2  Wash with mild soap and water daily and dry well. Apply A TracTain cream to BLE daily.      - US Lower Extrem Arteries Bilat with MYA (xpd); Future         Please see Wound Docs for complete plan including patient instructions.     Follow Up & Goals:     Follow up in about 1 week (around 11/9/2023).     Short term goals  Reduction of devitalized tissue  Reduction of bacterial burden  Stimulate and/or maintain acute phases of wound healing  Promote granulation formation  Promote epithelization  Promote compliance with plan of care  Address factors affecting wound healing  Optimize nutritional status  Optimize vascular status    Long term goals  Prevent loss of limb  Prevent infection  Conservative Wound Treatment  Prevent recurrent ulcerations  Resolve wound

## 2023-11-06 ENCOUNTER — HOSPITAL ENCOUNTER (OUTPATIENT)
Dept: RADIOLOGY | Facility: HOSPITAL | Age: 88
Discharge: HOME | End: 2023-11-06
Attending: NURSE PRACTITIONER
Payer: MEDICARE

## 2023-11-06 DIAGNOSIS — L89.892 PRESSURE INJURY OF TOE OF LEFT FOOT, STAGE 2: ICD-10-CM

## 2023-11-06 PROCEDURE — 93925 LOWER EXTREMITY STUDY: CPT | Mod: TC

## 2023-11-09 ENCOUNTER — CLINICAL SUPPORT (OUTPATIENT)
Dept: WOUND CARE | Facility: HOSPITAL | Age: 88
End: 2023-11-09
Attending: NURSE PRACTITIONER
Payer: MEDICARE

## 2023-11-09 VITALS — SYSTOLIC BLOOD PRESSURE: 125 MMHG | HEART RATE: 64 BPM | DIASTOLIC BLOOD PRESSURE: 71 MMHG | RESPIRATION RATE: 18 BRPM

## 2023-11-09 DIAGNOSIS — L89.892 PRESSURE INJURY OF TOE OF LEFT FOOT, STAGE 2: Primary | ICD-10-CM

## 2023-11-09 PROCEDURE — 99212 OFFICE O/P EST SF 10 MIN: CPT

## 2023-11-09 NOTE — PROGRESS NOTES
OUTPATIENT WOUND CARE          Patient Name: Gavin Madsen is a 88 y.o. male       Chief Complaint:  Wound Care Follow up    Review of patient's allergies indicates:   Allergen Reactions    Aspirin         I have reviewed the patient's medical, surgical, family and social history.      Subjective:    HPI     Wound Location: left 4th toe- now resolved    Wound Duration: onset 11/2/21    Wound Context: pressure    Wound Pain: mild    Associated S/S: callus/ darkening of skin of toe    11/2/23- 87 YO AAM seen today for initial evaluation. Daughter is present with patient, and is very concerned due to darkening of toe.   11/9/23- Wound is now resolved. Arterial US performed last week reviewed. No evidence of PAD. No new issues are reported.             Physical Exam  Vitals reviewed.   Constitutional:       Appearance: Normal appearance.   HENT:      Head: Normocephalic and atraumatic.      Right Ear: External ear normal.      Left Ear: External ear normal.   Cardiovascular:      Rate and Rhythm: Normal rate.      Pulses:           Dorsalis pedis pulses are 2+ on the right side and 2+ on the left side.   Pulmonary:      Effort: Pulmonary effort is normal.   Musculoskeletal:         General: Normal range of motion.      Right lower leg: No edema.      Left lower leg: No edema.   Feet:      Right foot:      Skin integrity: Dry skin present.      Left foot:      Skin integrity: Dry skin present.   Skin:     General: Skin is warm and dry.      Findings: No wound.   Neurological:      Mental Status: He is alert.          Please see Wound Docs for complete Wound Assessment and lower extremity assessment when applicable.    Documentation of any procedures can be viewed in Wound Docs if applicable.         Assessment and Plan:    1. Pressure injury of toe of left foot, stage 2  Wash with mild soap and water daily and dry well. Apply A TracTain cream to BLE daily.  Wound is resolved. No additional wound care follow up is  necessary at this time.         Please see Wound Docs for complete plan including patient instructions.

## 2023-12-09 DIAGNOSIS — Z71.89 COMPLEX CARE COORDINATION: ICD-10-CM

## 2024-01-15 ENCOUNTER — HOSPITAL ENCOUNTER (EMERGENCY)
Facility: HOSPITAL | Age: 89
Discharge: HOME | End: 2024-01-15
Payer: MEDICARE

## 2024-01-15 VITALS
DIASTOLIC BLOOD PRESSURE: 70 MMHG | OXYGEN SATURATION: 98 % | TEMPERATURE: 98 F | WEIGHT: 127.19 LBS | HEIGHT: 66 IN | HEART RATE: 77 BPM | RESPIRATION RATE: 17 BRPM | SYSTOLIC BLOOD PRESSURE: 132 MMHG | BODY MASS INDEX: 20.44 KG/M2

## 2024-01-15 DIAGNOSIS — M25.50 ARTHRALGIA, UNSPECIFIED JOINT: Primary | ICD-10-CM

## 2024-01-15 LAB
ANION GAP SERPL CALCULATED.3IONS-SCNC: 11 MMOL/L (ref 7–16)
BUN SERPL-MCNC: 14 MG/DL (ref 7–18)
BUN/CREAT SERPL: 16 (ref 6–20)
CALCIUM SERPL-MCNC: 9.4 MG/DL (ref 8.5–10.1)
CHLORIDE SERPL-SCNC: 103 MMOL/L (ref 98–107)
CO2 SERPL-SCNC: 28 MMOL/L (ref 21–32)
CREAT SERPL-MCNC: 0.87 MG/DL (ref 0.7–1.3)
EGFR (NO RACE VARIABLE) (RUSH/TITUS): 84 ML/MIN/1.73M2
EOSINOPHIL NFR BLD MANUAL: 5 % (ref 1–4)
ERYTHROCYTE [DISTWIDTH] IN BLOOD BY AUTOMATED COUNT: 12.9 % (ref 11.5–14.5)
GLUCOSE SERPL-MCNC: 104 MG/DL (ref 74–106)
HCT VFR BLD AUTO: 33.9 % (ref 40–54)
HGB BLD-MCNC: 11 G/DL (ref 13.5–18)
LYMPHOCYTES NFR BLD MANUAL: 21 % (ref 27–41)
MCH RBC QN AUTO: 29.8 PG (ref 27–31)
MCHC RBC AUTO-ENTMCNC: 32.4 G/DL (ref 32–36)
MCV RBC AUTO: 91.9 FL (ref 80–96)
MONOCYTES NFR BLD MANUAL: 12 % (ref 2–6)
MPC BLD CALC-MCNC: 9.9 FL (ref 9.4–12.4)
NEUTS SEG NFR BLD MANUAL: 62 % (ref 50–62)
NRBC BLD MANUAL-RTO: ABNORMAL %
PLATELET # BLD AUTO: 284 K/UL (ref 150–400)
PLATELET MORPHOLOGY: NORMAL
POTASSIUM SERPL-SCNC: 3.7 MMOL/L (ref 3.5–5.1)
RBC # BLD AUTO: 3.69 M/UL (ref 4.6–6.2)
RBC MORPH BLD: NORMAL
SODIUM SERPL-SCNC: 138 MMOL/L (ref 136–145)
WBC # BLD AUTO: 8 K/UL (ref 4.5–11)

## 2024-01-15 PROCEDURE — 99284 EMERGENCY DEPT VISIT MOD MDM: CPT | Mod: GF | Performed by: NURSE PRACTITIONER

## 2024-01-15 PROCEDURE — 80048 BASIC METABOLIC PNL TOTAL CA: CPT | Performed by: NURSE PRACTITIONER

## 2024-01-15 PROCEDURE — 99283 EMERGENCY DEPT VISIT LOW MDM: CPT

## 2024-01-15 PROCEDURE — 85025 COMPLETE CBC W/AUTO DIFF WBC: CPT | Performed by: NURSE PRACTITIONER

## 2024-01-15 NOTE — ED PROVIDER NOTES
Encounter Date: 1/15/2024       History     Chief Complaint   Patient presents with    Joint Pain     Pt presents with bilat shoulder and bilat knee pain.  Onset two weeks ago.     88 year old AAM presents to the ER for latrice shoulder, elbow and knee pain for 2 weeks.  Pt reports the pain is in the joint.  He reports the pain is worse in the morning and he feels stiff, then as the day goes on the pain improves.  Denies joint swelling and fever.     The history is provided by the patient.   Joint Pain  This is a new problem. The current episode started more than 1 week ago. The problem occurs daily. The problem has not changed since onset.Pertinent negatives include no chest pain, no abdominal pain, no headaches and no shortness of breath. He has tried nothing for the symptoms.     Review of patient's allergies indicates:   Allergen Reactions    Aspirin      Past Medical History:   Diagnosis Date    Elevated PSA     Hypertension     Malignant neoplasm of prostate      Past Surgical History:   Procedure Laterality Date    BIOPSY WITH TRANSRECTAL ULTRASOUND (TRUS) GUIDANCE Bilateral 04/11/2019    [place] 4+3=7     Family History   Problem Relation Age of Onset    Hypertension Father     Hypertension Mother     Diabetes Mother     Prostate cancer Brother     Cervical cancer Daughter     Hypertension Daughter     Lupus Son      Social History     Tobacco Use    Smoking status: Never     Passive exposure: Never    Smokeless tobacco: Never   Substance Use Topics    Alcohol use: Never    Drug use: Never     Review of Systems   Constitutional:  Negative for fever.   HENT:  Negative for sore throat.    Respiratory:  Negative for shortness of breath.    Cardiovascular:  Negative for chest pain.   Gastrointestinal:  Negative for abdominal pain and nausea.   Genitourinary:  Negative for dysuria.   Musculoskeletal:  Positive for arthralgias. Negative for back pain and joint swelling.   Skin:  Negative for rash.   Neurological:   Negative for weakness and headaches.   Hematological:  Does not bruise/bleed easily.       Physical Exam     Initial Vitals [01/15/24 1014]   BP Pulse Resp Temp SpO2   132/70 77 17 98.3 °F (36.8 °C) 98 %      MAP       --         Physical Exam    Nursing note and vitals reviewed.  Constitutional: He appears well-developed and well-nourished. He is not diaphoretic. No distress.   HENT:   Head: Normocephalic and atraumatic.   Eyes: Pupils are equal, round, and reactive to light.   Neck: Neck supple.   Cardiovascular:  Normal rate, regular rhythm, normal heart sounds and intact distal pulses.     Exam reveals no gallop and no friction rub.       No murmur heard.  Pulmonary/Chest: Breath sounds normal. No respiratory distress. He has no wheezes. He has no rhonchi. He has no rales. He exhibits no tenderness.   Musculoskeletal:      Right shoulder: No swelling, tenderness or bony tenderness. Normal range of motion.      Left shoulder: No swelling, tenderness or bony tenderness. Normal range of motion.      Right elbow: No swelling. Normal range of motion. No tenderness.      Left elbow: Normal range of motion. No tenderness.      Cervical back: Neck supple.      Right knee: No swelling, bony tenderness or crepitus. Normal range of motion. No tenderness.      Left knee: No swelling, bony tenderness or crepitus. Normal range of motion. No tenderness.     Neurological: He is alert and oriented to person, place, and time. GCS score is 15. GCS eye subscore is 4. GCS verbal subscore is 5. GCS motor subscore is 6.   Skin: Skin is warm and dry. Capillary refill takes less than 2 seconds.   Psychiatric: He has a normal mood and affect.         Medical Screening Exam   See Full Note    ED Course   Procedures  Labs Reviewed   CBC WITH DIFFERENTIAL - Abnormal; Notable for the following components:       Result Value    RBC 3.69 (*)     Hemoglobin 11.0 (*)     Hematocrit 33.9 (*)     All other components within normal limits   MANUAL  "DIFFERENTIAL - Abnormal; Notable for the following components:    Lymphocytes, Man % 21 (*)     Monocytes, Man % 12 (*)     Eosinophils, Man % 5 (*)     All other components within normal limits   BASIC METABOLIC PANEL - Normal   CBC W/ AUTO DIFFERENTIAL    Narrative:     The following orders were created for panel order CBC auto differential.  Procedure                               Abnormality         Status                     ---------                               -----------         ------                     CBC with Differential[[id number]]       Abnormal            Final result               Manual Differential[[id number]]         Abnormal            Final result                 Please view results for these tests on the individual orders.          Imaging Results    None          Medications - No data to display  Medical Decision Making  Amount and/or Complexity of Data Reviewed  Labs: ordered. Decision-making details documented in ED Course.               ED Course as of 01/15/24 1114   Mon Judson 15, 2024   1106 Lab was ordered to evaluate the renal function.   [AG]   1110 Had hoped to prescribe the patient Mobic.  However, he is allergic to ASA and reports "bad rash".  Will have him take OTC tylenol for pain and follow-up with PCP [AG]      ED Course User Index  [AG] Elisabeth Hernandez FNP                           Clinical Impression:   Final diagnoses:  [M25.50] Arthralgia, unspecified joint (Primary)        ED Disposition Condition    Discharge Stable          ED Prescriptions    None       Follow-up Information       Follow up With Specialties Details Why Contact Jeanette Munroe FNP Family Medicine Schedule an appointment as soon as possible for a visit in 1 week As needed, If symptoms worsen [address] [place] [address]  [facility] [address]  [phone]               Elisabeth Hernandez FNP  01/15/24 1114       Elisabeth Hernandez FNP  01/15/24 1114    "

## 2024-01-15 NOTE — DISCHARGE INSTRUCTIONS
Call your family doctor to schedule a follow-up appointment with them.  They may want to get outpatient lab on your to evaluation for arthritis.  Return for joint swelling/redness/fever.   Get over the counter Tylenol Arthritis for your pain.

## 2024-01-23 DIAGNOSIS — I10 ESSENTIAL (PRIMARY) HYPERTENSION: ICD-10-CM

## 2024-01-23 DIAGNOSIS — E78.5 HYPERLIPIDEMIA, UNSPECIFIED HYPERLIPIDEMIA TYPE: ICD-10-CM

## 2024-01-23 RX ORDER — LOSARTAN POTASSIUM 50 MG/1
50 TABLET ORAL DAILY
Qty: 90 TABLET | Refills: 3 | Status: SHIPPED | OUTPATIENT
Start: 2024-01-23

## 2024-01-23 RX ORDER — ATORVASTATIN CALCIUM 20 MG/1
20 TABLET, FILM COATED ORAL NIGHTLY
Qty: 90 TABLET | Refills: 3 | Status: SHIPPED | OUTPATIENT
Start: 2024-01-23

## 2024-01-23 RX ORDER — AMLODIPINE BESYLATE 5 MG/1
5 TABLET ORAL 2 TIMES DAILY
Qty: 180 TABLET | Refills: 3 | Status: SHIPPED | OUTPATIENT
Start: 2024-01-23

## 2024-01-24 PROBLEM — M25.50 ARTHRALGIA: Status: ACTIVE | Noted: 2024-01-24

## 2024-01-24 NOTE — PROGRESS NOTES
Clinic note     Patient name: Gavin Madsen is a 88 y.o. male   Chief compliant   Chief Complaint   Patient presents with    Shoulder Pain    Knee Pain     Pt c/o bilateral shoulder and knee pain off and on x 1 month       Subjective     History of present illness   In clinic for routine follow up   Seen in ED on 1/15/2024 for arthralgia, record reviewed   He continues to have bilateral shoulder pain and bilateral knee pain x one month  Allergy to ASA, mobic contraindicated      Past Medical History: HTN, bradycardia, prostate cancer      Cardiology: Dr Glez, last clinic note reviewed    Urology: Dr Chan        Social History     Tobacco Use    Smoking status: Never     Passive exposure: Never    Smokeless tobacco: Never   Substance Use Topics    Alcohol use: Never    Drug use: Never       Review of patient's allergies indicates:   Allergen Reactions    Aspirin        Past Medical History:   Diagnosis Date    Elevated PSA     Hypertension     Malignant neoplasm of prostate        Past Surgical History:   Procedure Laterality Date    BIOPSY WITH TRANSRECTAL ULTRASOUND (TRUS) GUIDANCE Bilateral 04/11/2019    [place] 4+3=7        Family History   Problem Relation Age of Onset    Hypertension Father     Hypertension Mother     Diabetes Mother     Prostate cancer Brother     Cervical cancer Daughter     Hypertension Daughter     Lupus Son          Current Outpatient Medications:     acetaminophen (TYLENOL) 500 MG tablet, Take 500 mg by mouth every 6 (six) hours as needed for Pain., Disp: , Rfl:     amLODIPine (NORVASC) 5 MG tablet, Take 1 tablet (5 mg total) by mouth 2 (two) times daily., Disp: 180 tablet, Rfl: 3    ascorbic acid, vitamin C, (VITAMIN C) 1000 MG tablet, Take 1,000 mg by mouth once daily., Disp: , Rfl:     atorvastatin (LIPITOR) 20 MG tablet, Take 1 tablet (20 mg total) by mouth every evening., Disp: 90 tablet, Rfl: 3    losartan (COZAAR) 50 MG tablet, Take 1 tablet (50 mg total) by  "mouth once daily., Disp: 90 tablet, Rfl: 3    Review of Systems   Constitutional:  Negative for activity change, appetite change, chills, fatigue, fever and unexpected weight change.   Respiratory:  Negative for cough and shortness of breath.    Cardiovascular:  Negative for chest pain, palpitations and leg swelling.   Gastrointestinal:  Negative for abdominal pain, blood in stool, change in bowel habit, constipation, diarrhea, nausea and vomiting.   Genitourinary:  Negative for difficulty urinating and dysuria.   Musculoskeletal:  Positive for arthralgias (bilateral shoulder pain, bilateral knee pain). Negative for gait problem and myalgias.   Neurological:  Negative for dizziness, light-headedness, headaches and coordination difficulties.   Psychiatric/Behavioral:  Negative for confusion, dysphoric mood and sleep disturbance. The patient is not nervous/anxious.        Objective     /67   Pulse 79   Resp 12   Ht 5' 6" (1.676 m)   Wt 57.9 kg (127 lb 11.2 oz)   SpO2 97%   BMI 20.61 kg/m²     Physical Exam   Constitutional: He is oriented to person, place, and time. normal appearance. No distress.   HENT:   Head: Atraumatic.   Mouth/Throat: Mucous membranes are moist.   Cardiovascular: Normal rate and regular rhythm. Pulmonary:      Effort: Pulmonary effort is normal. No respiratory distress.      Breath sounds: Normal breath sounds. No wheezing, rhonchi or rales.     Abdominal: Soft. Normal appearance and bowel sounds are normal. He exhibits no distension. There is no abdominal tenderness.   Musculoskeletal:         General: Normal range of motion.      Cervical back: Neck supple.      Right lower leg: No edema.      Left lower leg: No edema.   Neurological: He is alert and oriented to person, place, and time. Gait normal.   Skin: Skin is warm and dry.   Psychiatric: His behavior is normal. Mood normal.     EXAMINATION:  XR KNEE 1 OR 2 VIEW BILATERAL     CLINICAL HISTORY:  Pain in right knee   "   COMPARISON:  None     TECHNIQUE:  Frontal and lateral views of the bilateral knees.     FINDINGS:  Mild tricompartmental degenerative change of the bilateral knees.  No convincing acute fracture or dislocation demonstrated. No concerning radiopaque foreign body visualized.  Atherosclerotic calcification demonstrated.     Impression:     As above.     Point of Service: [facility]        Electronically signed by: Geovanny Dash  Date:                                            01/25/2024  Time:                                           11:34           Exam Ended: 01/25/24 11:26 CST           EXAMINATION:  XR SHOULDER COMPLETE 2 OR MORE VIEWS BILATERAL     CLINICAL HISTORY:  Pain in right shoulder     COMPARISON:  Right shoulder x-ray January 25, 2024     TECHNIQUE:  Frontal views of the bilateral shoulder were obtained in internal and external rotation .     FINDINGS:  Mild degenerative change of the bilateral acromioclavicular and glenohumeral joints.  No convincing acute fracture or dislocation demonstrated. No concerning radiopaque foreign body visualized.     Impression:     As above.     Point of Service: [facility]        Electronically signed by: Geovanny Dash  Date:                                            01/25/2024  Time:                                           11:33           Exam Ended: 01/25/24 11:29 CST           Lab Results   Component Value Date    WBC 8.00 01/15/2024    HGB 11.0 (L) 01/15/2024    HCT 33.9 (L) 01/15/2024    MCV 91.9 01/15/2024     01/15/2024       CMP  Sodium   Date Value Ref Range Status   01/15/2024 138 136 - 145 mmol/L Final     Potassium   Date Value Ref Range Status   01/15/2024 3.7 3.5 - 5.1 mmol/L Final     Chloride   Date Value Ref Range Status   01/15/2024 103 98 - 107 mmol/L Final     CO2   Date Value Ref Range Status   01/15/2024 28 21 - 32 mmol/L Final     Glucose   Date Value Ref Range Status   01/15/2024 104 74 - 106 mg/dL Final  "    BUN   Date Value Ref Range Status   01/15/2024 14 7 - 18 mg/dL Final     Creatinine   Date Value Ref Range Status   01/15/2024 0.87 0.70 - 1.30 mg/dL Final     Calcium   Date Value Ref Range Status   01/15/2024 9.4 8.5 - 10.1 mg/dL Final     Total Protein   Date Value Ref Range Status   08/14/2023 7.0 6.4 - 8.2 g/dL Final     Albumin   Date Value Ref Range Status   08/14/2023 3.9 3.5 - 5.0 g/dL Final     Bilirubin, Total   Date Value Ref Range Status   08/14/2023 0.7 >0.0 - 1.2 mg/dL Final     Alk Phos   Date Value Ref Range Status   08/14/2023 65 45 - 115 U/L Final     AST   Date Value Ref Range Status   08/14/2023 20 15 - 37 U/L Final     ALT   Date Value Ref Range Status   08/14/2023 28 16 - 61 U/L Final     Anion Gap   Date Value Ref Range Status   01/15/2024 11 7 - 16 mmol/L Final     eGFR    Date Value Ref Range Status   11/12/2021 110 >=60 mL/min/1.73m² Final     eGFR   Date Value Ref Range Status   04/04/2022 78 >=60 mL/min/1.73m² Final     No results found for: "TSH"  Lab Results   Component Value Date    CHOL 134 09/12/2022    CHOL 117 07/16/2021     Lab Results   Component Value Date    HDL 64 (H) 09/12/2022    HDL 63 (H) 07/16/2021     Lab Results   Component Value Date    LDLCALC 53 09/12/2022    LDLCALC 38 07/16/2021     Lab Results   Component Value Date    TRIG 86 09/12/2022    TRIG 78 07/16/2021     Lab Results   Component Value Date    CHOLHDL 2.1 09/12/2022    CHOLHDL 1.9 07/16/2021     No results found for: "LABA1C", "HGBA1C"      Assessment and Plan   Chronic pain of both knees  -     X-Ray Knee 1 or 2 View Bilateral; Future; Expected date: 01/25/2024  -     Ambulatory referral/consult to Pain Clinic; Future; Expected date: 02/01/2024    Chronic pain of both shoulders  -     X-Ray Shoulder 2 or More views Bilateral; Future; Expected date: 01/25/2024  -     Ambulatory referral/consult to Pain Clinic; Future; Expected date: 02/01/2024    Osteoarthritis of both shoulders, " unspecified osteoarthritis type    Osteoarthritis of both knees, unspecified osteoarthritis type    Essential (primary) hypertension          Patient Instructions  Patient Instructions   Xray of bilateral shoulders and bilateral knees obtained in clinic will notify of results when available   Will consider referral to pain management after review of imaging results     May use over the counter tylenol arthritis as needed, do not exceed maximum dose of 1 gram in 4 hours -or- 4 grams in 24 hours       Referral placed to Dr Padilla pain management after review of imaging results

## 2024-01-25 ENCOUNTER — OFFICE VISIT (OUTPATIENT)
Dept: FAMILY MEDICINE | Facility: CLINIC | Age: 89
End: 2024-01-25
Payer: MEDICARE

## 2024-01-25 ENCOUNTER — APPOINTMENT (OUTPATIENT)
Dept: RADIOLOGY | Facility: CLINIC | Age: 89
End: 2024-01-25
Attending: NURSE PRACTITIONER
Payer: MEDICARE

## 2024-01-25 VITALS
SYSTOLIC BLOOD PRESSURE: 135 MMHG | OXYGEN SATURATION: 97 % | BODY MASS INDEX: 20.52 KG/M2 | WEIGHT: 127.69 LBS | HEART RATE: 79 BPM | DIASTOLIC BLOOD PRESSURE: 67 MMHG | HEIGHT: 66 IN | RESPIRATION RATE: 12 BRPM

## 2024-01-25 DIAGNOSIS — M25.512 CHRONIC PAIN OF BOTH SHOULDERS: Chronic | ICD-10-CM

## 2024-01-25 DIAGNOSIS — G89.29 CHRONIC PAIN OF BOTH SHOULDERS: Chronic | ICD-10-CM

## 2024-01-25 DIAGNOSIS — M25.562 CHRONIC PAIN OF BOTH KNEES: Primary | Chronic | ICD-10-CM

## 2024-01-25 DIAGNOSIS — M19.012 OSTEOARTHRITIS OF BOTH SHOULDERS, UNSPECIFIED OSTEOARTHRITIS TYPE: Chronic | ICD-10-CM

## 2024-01-25 DIAGNOSIS — M17.0 OSTEOARTHRITIS OF BOTH KNEES, UNSPECIFIED OSTEOARTHRITIS TYPE: Chronic | ICD-10-CM

## 2024-01-25 DIAGNOSIS — G89.29 CHRONIC PAIN OF BOTH KNEES: Primary | Chronic | ICD-10-CM

## 2024-01-25 DIAGNOSIS — I10 ESSENTIAL (PRIMARY) HYPERTENSION: Chronic | ICD-10-CM

## 2024-01-25 DIAGNOSIS — M19.011 OSTEOARTHRITIS OF BOTH SHOULDERS, UNSPECIFIED OSTEOARTHRITIS TYPE: Chronic | ICD-10-CM

## 2024-01-25 DIAGNOSIS — M25.511 CHRONIC PAIN OF BOTH SHOULDERS: Chronic | ICD-10-CM

## 2024-01-25 DIAGNOSIS — M25.561 CHRONIC PAIN OF BOTH KNEES: Primary | Chronic | ICD-10-CM

## 2024-01-25 PROCEDURE — 73030 X-RAY EXAM OF SHOULDER: CPT | Mod: 50,TC,RHCUB,FY | Performed by: NURSE PRACTITIONER

## 2024-01-25 PROCEDURE — 99213 OFFICE O/P EST LOW 20 MIN: CPT | Mod: ,,, | Performed by: NURSE PRACTITIONER

## 2024-01-25 RX ORDER — ACETAMINOPHEN 500 MG
500 TABLET ORAL EVERY 6 HOURS PRN
COMMUNITY

## 2024-01-25 NOTE — PATIENT INSTRUCTIONS
Xray of bilateral shoulders and bilateral knees obtained in clinic will notify of results when available   Will consider referral to pain management after review of imaging results     May use over the counter tylenol arthritis as needed, do not exceed maximum dose of 1 gram in 4 hours -or- 4 grams in 24 hours

## 2024-02-07 ENCOUNTER — TELEPHONE (OUTPATIENT)
Dept: FAMILY MEDICINE | Facility: CLINIC | Age: 89
End: 2024-02-07
Payer: MEDICARE

## 2024-02-07 NOTE — TELEPHONE ENCOUNTER
Talked with pts daughter. That pt is not acting like his normal self. He denies any CP, SOB They have checked his VS bp was around 166/70s. He says he just does not feel good. Scheduled pt for in the morning, with instructions to have pt evaluated if anything changes over night especially any confusion, CP, SOB or anything that made them uncomfortable. Verbalized understanding.

## 2024-02-07 NOTE — TELEPHONE ENCOUNTER
----- Message from Ignacia Gilliam sent at 2/7/2024 10:06 AM CST -----  Please return call to daughter Kelley Mandel concerning father Gavin her call back number is [phone]

## 2024-02-13 ENCOUNTER — OFFICE VISIT (OUTPATIENT)
Dept: FAMILY MEDICINE | Facility: CLINIC | Age: 89
End: 2024-02-13
Payer: MEDICARE

## 2024-02-13 VITALS
DIASTOLIC BLOOD PRESSURE: 69 MMHG | SYSTOLIC BLOOD PRESSURE: 134 MMHG | OXYGEN SATURATION: 96 % | BODY MASS INDEX: 20.51 KG/M2 | HEART RATE: 64 BPM | WEIGHT: 127.63 LBS | HEIGHT: 66 IN | TEMPERATURE: 99 F

## 2024-02-13 DIAGNOSIS — R53.1 GENERALIZED WEAKNESS: Chronic | ICD-10-CM

## 2024-02-13 DIAGNOSIS — R60.0 EDEMA OF BOTH LOWER EXTREMITIES: ICD-10-CM

## 2024-02-13 DIAGNOSIS — I10 ESSENTIAL (PRIMARY) HYPERTENSION: Primary | Chronic | ICD-10-CM

## 2024-02-13 PROCEDURE — 80048 BASIC METABOLIC PNL TOTAL CA: CPT | Mod: ,,, | Performed by: CLINICAL MEDICAL LABORATORY

## 2024-02-13 PROCEDURE — 99214 OFFICE O/P EST MOD 30 MIN: CPT | Mod: ,,, | Performed by: NURSE PRACTITIONER

## 2024-02-13 RX ORDER — FUROSEMIDE 20 MG/1
10 TABLET ORAL DAILY
Qty: 5 TABLET | Refills: 0 | Status: SHIPPED | OUTPATIENT
Start: 2024-02-13 | End: 2024-03-12 | Stop reason: SDUPTHER

## 2024-02-13 NOTE — PROGRESS NOTES
"Clinic note     Patient name: Gavin Madsen is a 88 y.o. male   Chief compliant   Chief Complaint   Patient presents with    Edema     Edema to both feet for the last few weeks.        Subjective     History of present illness   In clinic for evaluation of edema to BLE x two weeks, worse on left than right; reports edema causing feet to feel "sore"   Denies any chest pain, shortness of breath, dizziness, palpitations   Reports generalized fatigue     Past Medical History: HTN, bradycardia, prostate cancer      Cardiology: Dr Glez, last clinic note reviewed    Urology: Dr Chan    Daughter present during office visit           Social History     Tobacco Use    Smoking status: Never     Passive exposure: Never    Smokeless tobacco: Never   Substance Use Topics    Alcohol use: Never    Drug use: Never       Review of patient's allergies indicates:   Allergen Reactions    Aspirin        Past Medical History:   Diagnosis Date    Elevated PSA     Hypertension     Malignant neoplasm of prostate        Past Surgical History:   Procedure Laterality Date    BIOPSY WITH TRANSRECTAL ULTRASOUND (TRUS) GUIDANCE Bilateral 04/11/2019    [place] 4+3=7        Family History   Problem Relation Age of Onset    Hypertension Father     Hypertension Mother     Diabetes Mother     Prostate cancer Brother     Cervical cancer Daughter     Hypertension Daughter     Lupus Son          Current Outpatient Medications:     acetaminophen (TYLENOL) 500 MG tablet, Take 500 mg by mouth every 6 (six) hours as needed for Pain., Disp: , Rfl:     amLODIPine (NORVASC) 5 MG tablet, Take 1 tablet (5 mg total) by mouth 2 (two) times daily., Disp: 180 tablet, Rfl: 3    ascorbic acid, vitamin C, (VITAMIN C) 1000 MG tablet, Take 1,000 mg by mouth once daily., Disp: , Rfl:     atorvastatin (LIPITOR) 20 MG tablet, Take 1 tablet (20 mg total) by mouth every evening., Disp: 90 tablet, Rfl: 3    losartan (COZAAR) 50 MG tablet, Take 1 tablet (50 mg total) by mouth " "once daily., Disp: 90 tablet, Rfl: 3    furosemide (LASIX) 20 MG tablet, Take 0.5 tablets (10 mg total) by mouth once daily., Disp: 5 tablet, Rfl: 0    Review of Systems   Constitutional:  Positive for fatigue. Negative for activity change, chills, fever and unexpected weight change.   Respiratory:  Negative for cough and shortness of breath.    Cardiovascular:  Positive for leg swelling. Negative for chest pain and palpitations.   Gastrointestinal:  Negative for abdominal pain, diarrhea, nausea and vomiting.   Genitourinary:  Negative for decreased urine volume, difficulty urinating and dysuria.   Musculoskeletal:  Positive for arthralgias. Negative for gait problem.   Neurological:  Negative for dizziness, light-headedness and headaches.   Psychiatric/Behavioral:  Negative for sleep disturbance.        Objective     /69   Pulse 64   Temp 98.5 °F (36.9 °C)   Ht 5' 6" (1.676 m)   Wt 57.9 kg (127 lb 9.6 oz)   SpO2 96%   BMI 20.60 kg/m²     Physical Exam   Constitutional: He is oriented to person, place, and time. normal appearance. No distress.   HENT:   Head: Atraumatic.   Mouth/Throat: Mucous membranes are moist.   Cardiovascular: Normal rate and regular rhythm. Pulmonary:      Effort: Pulmonary effort is normal. No respiratory distress.      Breath sounds: Normal breath sounds. No wheezing, rhonchi or rales.     Abdominal: Soft. Normal appearance and bowel sounds are normal. He exhibits no distension. There is no abdominal tenderness.   Musculoskeletal:         General: Normal range of motion.      Cervical back: Neck supple.      Right lower leg: Edema present.      Left lower leg: Edema present.      Comments: Trace edema BLE    Neurological: He is alert and oriented to person, place, and time. Gait normal.   Skin: Skin is warm and dry.   Psychiatric: His behavior is normal. Mood normal.       Lab Results   Component Value Date    WBC 8.00 01/15/2024    HGB 11.0 (L) 01/15/2024    HCT 33.9 (L) " "01/15/2024    MCV 91.9 01/15/2024     01/15/2024       CMP  Sodium   Date Value Ref Range Status   01/15/2024 138 136 - 145 mmol/L Final     Potassium   Date Value Ref Range Status   01/15/2024 3.7 3.5 - 5.1 mmol/L Final     Chloride   Date Value Ref Range Status   01/15/2024 103 98 - 107 mmol/L Final     CO2   Date Value Ref Range Status   01/15/2024 28 21 - 32 mmol/L Final     Glucose   Date Value Ref Range Status   01/15/2024 104 74 - 106 mg/dL Final     BUN   Date Value Ref Range Status   01/15/2024 14 7 - 18 mg/dL Final     Creatinine   Date Value Ref Range Status   01/15/2024 0.87 0.70 - 1.30 mg/dL Final     Calcium   Date Value Ref Range Status   01/15/2024 9.4 8.5 - 10.1 mg/dL Final     Total Protein   Date Value Ref Range Status   08/14/2023 7.0 6.4 - 8.2 g/dL Final     Albumin   Date Value Ref Range Status   08/14/2023 3.9 3.5 - 5.0 g/dL Final     Bilirubin, Total   Date Value Ref Range Status   08/14/2023 0.7 >0.0 - 1.2 mg/dL Final     Alk Phos   Date Value Ref Range Status   08/14/2023 65 45 - 115 U/L Final     AST   Date Value Ref Range Status   08/14/2023 20 15 - 37 U/L Final     ALT   Date Value Ref Range Status   08/14/2023 28 16 - 61 U/L Final     Anion Gap   Date Value Ref Range Status   01/15/2024 11 7 - 16 mmol/L Final     eGFR    Date Value Ref Range Status   11/12/2021 110 >=60 mL/min/1.73m² Final     eGFR   Date Value Ref Range Status   04/04/2022 78 >=60 mL/min/1.73m² Final     No results found for: "TSH"  Lab Results   Component Value Date    CHOL 134 09/12/2022    CHOL 117 07/16/2021     Lab Results   Component Value Date    HDL 64 (H) 09/12/2022    HDL 63 (H) 07/16/2021     Lab Results   Component Value Date    LDLCALC 53 09/12/2022    LDLCALC 38 07/16/2021     Lab Results   Component Value Date    TRIG 86 09/12/2022    TRIG 78 07/16/2021     Lab Results   Component Value Date    CHOLHDL 2.1 09/12/2022    CHOLHDL 1.9 07/16/2021     No results found for: "LABA1C", " ""HGBA1C"      Assessment and Plan   Essential (primary) hypertension    Generalized weakness  -     Basic Metabolic Panel; Future; Expected date: 02/13/2024    Edema of both lower extremities  -     Basic Metabolic Panel; Future; Expected date: 02/13/2024  -     furosemide (LASIX) 20 MG tablet; Take 0.5 tablets (10 mg total) by mouth once daily.  Dispense: 5 tablet; Refill: 0          Patient Instructions  Patient Instructions   Lasix 10 mg daily for five days     Elevate lower extremities when possible     Repeat BMP in one week  Follow up in clinic one week if symptoms persist, sooner if symptoms worsen                                "

## 2024-02-13 NOTE — PATIENT INSTRUCTIONS
Lasix 10 mg daily for five days     Elevate lower extremities when possible     Repeat BMP in one week  Follow up in clinic one week if symptoms persist, sooner if symptoms worsen

## 2024-02-14 LAB
ANION GAP SERPL CALCULATED.3IONS-SCNC: 8 MMOL/L (ref 7–16)
BUN SERPL-MCNC: 16 MG/DL (ref 7–18)
BUN/CREAT SERPL: 21 (ref 6–20)
CALCIUM SERPL-MCNC: 9.5 MG/DL (ref 8.5–10.1)
CHLORIDE SERPL-SCNC: 111 MMOL/L (ref 98–107)
CO2 SERPL-SCNC: 25 MMOL/L (ref 21–32)
CREAT SERPL-MCNC: 0.78 MG/DL (ref 0.7–1.3)
EGFR (NO RACE VARIABLE) (RUSH/TITUS): 87 ML/MIN/1.73M2
GLUCOSE SERPL-MCNC: 101 MG/DL (ref 74–106)
POTASSIUM SERPL-SCNC: 3.6 MMOL/L (ref 3.5–5.1)
SODIUM SERPL-SCNC: 140 MMOL/L (ref 136–145)

## 2024-03-12 ENCOUNTER — OFFICE VISIT (OUTPATIENT)
Dept: FAMILY MEDICINE | Facility: CLINIC | Age: 89
End: 2024-03-12
Payer: MEDICARE

## 2024-03-12 VITALS
RESPIRATION RATE: 12 BRPM | DIASTOLIC BLOOD PRESSURE: 62 MMHG | SYSTOLIC BLOOD PRESSURE: 119 MMHG | HEIGHT: 66 IN | HEART RATE: 63 BPM | BODY MASS INDEX: 20.81 KG/M2 | WEIGHT: 129.5 LBS | OXYGEN SATURATION: 97 %

## 2024-03-12 DIAGNOSIS — C61 MALIGNANT NEOPLASM OF PROSTATE: ICD-10-CM

## 2024-03-12 DIAGNOSIS — R00.1 BRADYCARDIA: Chronic | ICD-10-CM

## 2024-03-12 DIAGNOSIS — R60.0 EDEMA OF BOTH LOWER EXTREMITIES: ICD-10-CM

## 2024-03-12 DIAGNOSIS — I10 ESSENTIAL (PRIMARY) HYPERTENSION: Primary | Chronic | ICD-10-CM

## 2024-03-12 PROCEDURE — 99214 OFFICE O/P EST MOD 30 MIN: CPT | Mod: ,,, | Performed by: NURSE PRACTITIONER

## 2024-03-12 RX ORDER — FUROSEMIDE 20 MG/1
10 TABLET ORAL DAILY
Qty: 15 TABLET | Refills: 0 | Status: SHIPPED | OUTPATIENT
Start: 2024-03-12 | End: 2024-04-10 | Stop reason: SDUPTHER

## 2024-03-12 NOTE — PATIENT INSTRUCTIONS
Elevate lower extremities when possible   Lasix 10 mg daily   BMP in two weeks     Follow up in clinic in one month and as needed     Contact Dr Glez, cardiology, to schedule follow up appointment

## 2024-03-12 NOTE — PROGRESS NOTES
Clinic note     Patient name: Gavin Madsen is a 89 y.o. male   Chief compliant   Chief Complaint   Patient presents with    Edema     Pt c/o right hand swelling and bilateral leg and feet swelling x 3-4 days       Subjective     History of present illness   In clinic for evaluation of edema to BLE, left worse than right and to right hand x 4 days   Recently treated with lasix for similar symptoms which was effective, symptoms have recurred   He stays busy throughout the day and does not sit to elevate extremities very often except during the night.   Weight is up 2 # since 2/13/24  He denies any chest pain or shortness of breath    Past Medical History: HTN, bradycardia, prostate cancer      Cardiology: Dr Glez, last clinic note reviewed    Urology: Dr Chan     Daughter, Kelley, present during office visit         Social History     Tobacco Use    Smoking status: Never     Passive exposure: Never    Smokeless tobacco: Never   Substance Use Topics    Alcohol use: Never    Drug use: Never       Review of patient's allergies indicates:   Allergen Reactions    Aspirin        Past Medical History:   Diagnosis Date    Elevated PSA     Hypertension     Malignant neoplasm of prostate        Past Surgical History:   Procedure Laterality Date    BIOPSY WITH TRANSRECTAL ULTRASOUND (TRUS) GUIDANCE Bilateral 04/11/2019    [place] 4+3=7        Family History   Problem Relation Age of Onset    Hypertension Father     Hypertension Mother     Diabetes Mother     Prostate cancer Brother     Cervical cancer Daughter     Hypertension Daughter     Lupus Son          Current Outpatient Medications:     acetaminophen (TYLENOL) 500 MG tablet, Take 500 mg by mouth every 6 (six) hours as needed for Pain., Disp: , Rfl:     amLODIPine (NORVASC) 5 MG tablet, Take 1 tablet (5 mg total) by mouth 2 (two) times daily., Disp: 180 tablet, Rfl: 3    ascorbic acid, vitamin C, (VITAMIN C) 1000 MG tablet, Take 1,000 mg by mouth once daily., Disp: , Rfl:  "    atorvastatin (LIPITOR) 20 MG tablet, Take 1 tablet (20 mg total) by mouth every evening., Disp: 90 tablet, Rfl: 3    losartan (COZAAR) 50 MG tablet, Take 1 tablet (50 mg total) by mouth once daily., Disp: 90 tablet, Rfl: 3    furosemide (LASIX) 20 MG tablet, Take 0.5 tablets (10 mg total) by mouth once daily., Disp: 15 tablet, Rfl: 0    Review of Systems   Constitutional:  Negative for activity change, appetite change, chills, fatigue, fever and unexpected weight change.   Respiratory:  Negative for cough and shortness of breath.    Cardiovascular:  Positive for leg swelling. Negative for chest pain and palpitations.   Gastrointestinal:  Negative for abdominal pain, blood in stool, change in bowel habit, constipation, diarrhea, nausea and vomiting.   Genitourinary:  Negative for difficulty urinating and dysuria.   Musculoskeletal:  Positive for arthralgias. Negative for gait problem and myalgias.   Neurological:  Negative for dizziness, light-headedness, headaches and coordination difficulties.   Psychiatric/Behavioral:  Negative for confusion, dysphoric mood and sleep disturbance. The patient is not nervous/anxious.        Objective     /62   Pulse 63   Resp 12   Ht 5' 6" (1.676 m)   Wt 58.7 kg (129 lb 8 oz)   SpO2 97%   BMI 20.90 kg/m²     Physical Exam   Constitutional: He is oriented to person, place, and time. normal appearance. No distress.   HENT:   Head: Atraumatic.   Mouth/Throat: Mucous membranes are moist.   Cardiovascular: Normal rate and regular rhythm. Pulmonary:      Effort: Pulmonary effort is normal. No respiratory distress.      Breath sounds: Normal breath sounds. No wheezing, rhonchi or rales.     Abdominal: Soft. Normal appearance and bowel sounds are normal. He exhibits no distension. There is no abdominal tenderness.   Musculoskeletal:         General: Normal range of motion.      Cervical back: Neck supple.      Right lower leg: No edema.      Left lower leg: No edema. " "  Neurological: He is alert and oriented to person, place, and time. Gait normal.   Skin: Skin is warm and dry.   Psychiatric: His behavior is normal. Mood normal.       Lab Results   Component Value Date    WBC 8.00 01/15/2024    HGB 11.0 (L) 01/15/2024    HCT 33.9 (L) 01/15/2024    MCV 91.9 01/15/2024     01/15/2024       CMP  Sodium   Date Value Ref Range Status   02/21/2024 140 136 - 145 mmol/L Final     Potassium   Date Value Ref Range Status   02/21/2024 3.5 3.5 - 5.1 mmol/L Final     Chloride   Date Value Ref Range Status   02/21/2024 107 98 - 107 mmol/L Final     CO2   Date Value Ref Range Status   02/21/2024 28 21 - 32 mmol/L Final     Glucose   Date Value Ref Range Status   02/21/2024 109 (H) 74 - 106 mg/dL Final     BUN   Date Value Ref Range Status   02/21/2024 13 7 - 18 mg/dL Final     Creatinine   Date Value Ref Range Status   02/21/2024 0.78 0.70 - 1.30 mg/dL Final     Calcium   Date Value Ref Range Status   02/21/2024 9.4 8.5 - 10.1 mg/dL Final     Total Protein   Date Value Ref Range Status   08/14/2023 7.0 6.4 - 8.2 g/dL Final     Albumin   Date Value Ref Range Status   08/14/2023 3.9 3.5 - 5.0 g/dL Final     Bilirubin, Total   Date Value Ref Range Status   08/14/2023 0.7 >0.0 - 1.2 mg/dL Final     Alk Phos   Date Value Ref Range Status   08/14/2023 65 45 - 115 U/L Final     AST   Date Value Ref Range Status   08/14/2023 20 15 - 37 U/L Final     ALT   Date Value Ref Range Status   08/14/2023 28 16 - 61 U/L Final     Anion Gap   Date Value Ref Range Status   02/21/2024 9 7 - 16 mmol/L Final     eGFR    Date Value Ref Range Status   11/12/2021 110 >=60 mL/min/1.73m² Final     eGFR   Date Value Ref Range Status   04/04/2022 78 >=60 mL/min/1.73m² Final     No results found for: "TSH"  Lab Results   Component Value Date    CHOL 134 09/12/2022    CHOL 117 07/16/2021     Lab Results   Component Value Date    HDL 64 (H) 09/12/2022    HDL 63 (H) 07/16/2021     Lab Results   Component " "Value Date    LDLCALC 53 09/12/2022    LDLCALC 38 07/16/2021     Lab Results   Component Value Date    TRIG 86 09/12/2022    TRIG 78 07/16/2021     Lab Results   Component Value Date    CHOLHDL 2.1 09/12/2022    CHOLHDL 1.9 07/16/2021     No results found for: "LABA1C", "HGBA1C"      Assessment and Plan   Essential (primary) hypertension  -     Basic Metabolic Panel; Future; Expected date: 03/26/2024    Edema of both lower extremities  -     furosemide (LASIX) 20 MG tablet; Take 0.5 tablets (10 mg total) by mouth once daily.  Dispense: 15 tablet; Refill: 0  -     Basic Metabolic Panel; Future; Expected date: 03/26/2024    Bradycardia    Malignant neoplasm of prostate  Comments:  followed by Dr Chan          Patient Instructions  Patient Instructions   Elevate lower extremities when possible   Lasix 10 mg daily   BMP in two weeks     Follow up in clinic in one month and as needed     Contact Dr Glez, cardiology, to schedule follow up appointment                             "

## 2024-03-18 ENCOUNTER — TELEPHONE (OUTPATIENT)
Dept: CARDIOLOGY | Facility: CLINIC | Age: 89
End: 2024-03-18
Payer: MEDICARE

## 2024-03-18 NOTE — TELEPHONE ENCOUNTER
Called and approved with patient's daughter to move patient's appt from 3/27 to 3/25.  Verbalized understanding  -HW

## 2024-03-25 ENCOUNTER — OFFICE VISIT (OUTPATIENT)
Dept: CARDIOLOGY | Facility: CLINIC | Age: 89
End: 2024-03-25
Payer: MEDICARE

## 2024-03-25 VITALS
SYSTOLIC BLOOD PRESSURE: 130 MMHG | BODY MASS INDEX: 20.5 KG/M2 | WEIGHT: 127 LBS | HEART RATE: 77 BPM | DIASTOLIC BLOOD PRESSURE: 78 MMHG | OXYGEN SATURATION: 97 %

## 2024-03-25 DIAGNOSIS — R00.1 BRADYCARDIA: ICD-10-CM

## 2024-03-25 DIAGNOSIS — I10 ESSENTIAL (PRIMARY) HYPERTENSION: Primary | ICD-10-CM

## 2024-03-25 DIAGNOSIS — E78.2 MIXED HYPERLIPIDEMIA: ICD-10-CM

## 2024-03-25 DIAGNOSIS — R60.0 EDEMA OF BOTH LOWER EXTREMITIES: ICD-10-CM

## 2024-03-25 PROCEDURE — 93010 ELECTROCARDIOGRAM REPORT: CPT | Mod: S$PBB,,, | Performed by: INTERNAL MEDICINE

## 2024-03-25 PROCEDURE — 99213 OFFICE O/P EST LOW 20 MIN: CPT | Mod: PBBFAC,25 | Performed by: INTERNAL MEDICINE

## 2024-03-25 PROCEDURE — 93005 ELECTROCARDIOGRAM TRACING: CPT | Mod: PBBFAC | Performed by: INTERNAL MEDICINE

## 2024-03-25 PROCEDURE — 99213 OFFICE O/P EST LOW 20 MIN: CPT | Mod: S$PBB,,, | Performed by: INTERNAL MEDICINE

## 2024-03-25 NOTE — PROGRESS NOTES
Cardiology Clinic Note:    PCP: Jeanette Mc FNP    REFERRING PHYSICIAN:     CHIEF COMPLAINT: Chest apoin    HISTORY OF PRESENT ILLNESS:  Gavin Madsen is a 89 y.o. male who presents for evaluation of bradycardia, remains asymptomatic, no syncope, palpitations or dizziness.  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    His wife and daughter at bedside report his is active, however has been slowing down over last several months.  He notes episodes of weakness and fatigue, come and go spontaneously, last seconds up to a minute, will sit down, symptoms usually resolve before he sits down, have resolved. . . He denies chest pain, pressure or shortness of breath.     Review of Systems:     Review of Systems   Constitutional:  Positive for malaise/fatigue and weight loss.   HENT: Negative.     Eyes: Negative.    Respiratory: Negative.     Cardiovascular: Negative.    Gastrointestinal: Negative.    Genitourinary: Negative.    Musculoskeletal: Negative.    Skin: Negative.    Neurological: Negative.    Endo/Heme/Allergies: Negative.    Psychiatric/Behavioral: Negative.          PAST MEDICAL HISTORY:  Past Medical History:   Diagnosis Date    Elevated PSA     Hypertension     Malignant neoplasm of prostate        PAST SURGICAL HISTORY:  Past Surgical History:   Procedure Laterality Date    BIOPSY WITH TRANSRECTAL ULTRASOUND (TRUS) GUIDANCE Bilateral 04/11/2019    [place] 4+3=7       SOCIAL HISTORY:  Social History     Socioeconomic History    Marital status: Single   Tobacco Use    Smoking status: Never     Passive exposure: Never    Smokeless tobacco: Never   Substance and Sexual Activity    Alcohol use: Never    Drug use: Never    Sexual activity: Not Currently       FAMILY HISTORY:  Family History   Problem Relation Age of Onset    Hypertension Father     Hypertension Mother     Diabetes Mother     Prostate cancer Brother     Cervical cancer Daughter     Hypertension Daughter     Lupus Son        ALLERGIES:  Allergies as  of 03/25/2024 - Reviewed 03/25/2024   Allergen Reaction Noted    Aspirin  04/19/2021         MEDICATIONS:  Current Outpatient Medications on File Prior to Visit   Medication Sig Dispense Refill    amLODIPine (NORVASC) 5 MG tablet Take 1 tablet (5 mg total) by mouth 2 (two) times daily. 180 tablet 3    atorvastatin (LIPITOR) 20 MG tablet Take 1 tablet (20 mg total) by mouth every evening. 90 tablet 3    furosemide (LASIX) 20 MG tablet Take 0.5 tablets (10 mg total) by mouth once daily. 15 tablet 0    losartan (COZAAR) 50 MG tablet Take 1 tablet (50 mg total) by mouth once daily. 90 tablet 3    acetaminophen (TYLENOL) 500 MG tablet Take 500 mg by mouth every 6 (six) hours as needed for Pain.      ascorbic acid, vitamin C, (VITAMIN C) 1000 MG tablet Take 1,000 mg by mouth once daily.       No current facility-administered medications on file prior to visit.          PHYSICAL EXAM:  Blood pressure 130/78, pulse 77, weight 57.6 kg (127 lb), SpO2 97 %.  Wt Readings from Last 3 Encounters:   03/25/24 57.6 kg (127 lb)   03/12/24 58.7 kg (129 lb 8 oz)   02/13/24 57.9 kg (127 lb 9.6 oz)      Body mass index is 20.5 kg/m².    Physical Exam  Constitutional:       Appearance: Normal appearance. He is normal weight.   HENT:      Head: Normocephalic and atraumatic.      Right Ear: External ear normal.      Left Ear: External ear normal.      Mouth/Throat:      Mouth: Mucous membranes are moist.   Eyes:      Extraocular Movements: Extraocular movements intact.      Conjunctiva/sclera: Conjunctivae normal.      Pupils: Pupils are equal, round, and reactive to light.   Neck:      Vascular: Carotid bruit present.   Cardiovascular:      Rate and Rhythm: Regular rhythm. Bradycardia present.      Pulses: Normal pulses.      Heart sounds: No murmur heard.  Pulmonary:      Effort: Pulmonary effort is normal.      Breath sounds: Normal breath sounds.   Abdominal:      General: Abdomen is flat. Bowel sounds are normal.      Palpations:  Abdomen is soft.   Musculoskeletal:         General: Normal range of motion.   Skin:     General: Skin is warm and dry.   Neurological:      General: No focal deficit present.      Mental Status: He is alert.          LABS REVIEWED:  Lab Results   Component Value Date    WBC 8.00 01/15/2024    RBC 3.69 (L) 01/15/2024    HGB 11.0 (L) 01/15/2024    HCT 33.9 (L) 01/15/2024    MCV 91.9 01/15/2024    MCH 29.8 01/15/2024    MCHC 32.4 01/15/2024    RDW 12.9 01/15/2024     01/15/2024    MPV 9.9 01/15/2024    NRBC 0.0 08/14/2023     Lab Results   Component Value Date     02/21/2024    K 3.5 02/21/2024     02/21/2024    CO2 28 02/21/2024    BUN 13 02/21/2024    MG 2.5 (H) 08/14/2023     Lab Results   Component Value Date    AST 20 08/14/2023    ALT 28 08/14/2023     Lab Results   Component Value Date     (H) 02/21/2024     Lab Results   Component Value Date    CHOL 134 09/12/2022    HDL 64 (H) 09/12/2022    TRIG 86 09/12/2022    CHOLHDL 2.1 09/12/2022       CARDIAC STUDIES REVIEWED:    OTHER IMAGING STUDIES REVIEWED:        ASSESSMENT:   Essential (primary) hypertension  -     EKG 12-lead; Future    Mixed hyperlipidemia    Bradycardia    Edema of both lower extremities          PLAN:  1. Bradycardia: mild sinus joey on Zio, short runs of asymptomatic SVT , up to four beats, no intervention indicated, remains asymptomatic  . 2. Fatigue, normal l LV function, does not appear to be cardiac mediated.   3. Hypertension: much better controlled off Normodyne, on Losartin 50 mg q d.   4. Hyperlipidemia: on Lipitor, at goal on lipid panel.       No aspirin due to true allergy       Follow up six months, sooner if symptoms change.

## 2024-03-26 LAB
OHS QRS DURATION: 86 MS
OHS QTC CALCULATION: 408 MS

## 2024-04-10 PROBLEM — E78.5 HYPERLIPIDEMIA: Chronic | Status: ACTIVE | Noted: 2021-07-09

## 2024-04-10 PROBLEM — I10 ESSENTIAL (PRIMARY) HYPERTENSION: Chronic | Status: ACTIVE | Noted: 2021-07-09

## 2024-04-10 PROBLEM — R60.0 EDEMA OF BOTH LOWER EXTREMITIES: Chronic | Status: ACTIVE | Noted: 2024-03-12

## 2024-04-10 PROBLEM — R00.1 BRADYCARDIA: Chronic | Status: ACTIVE | Noted: 2023-08-14

## 2024-04-10 NOTE — PROGRESS NOTES
Clinic note     Patient name: Gavin Madsen is a 89 y.o. male   Chief compliant   Chief Complaint   Patient presents with    Follow-up    Edema     Pt states swelling to feet and legs is some better. Pt is still having swelling to right hand.       Subjective     History of present illness   Follow up on edema to lower extremities after starting lasix   He reports edema  to lower extremities is better  He continues to have swelling to right hand, denies any pain or injury; will obtain xray of right hand     Past Medical History: HTN, bradycardia, prostate cancer      Cardiology: Dr Glez, last clinic note reviewed    Urology: Dr Chan          Social History     Tobacco Use    Smoking status: Never     Passive exposure: Never    Smokeless tobacco: Never   Substance Use Topics    Alcohol use: Never    Drug use: Never       Review of patient's allergies indicates:   Allergen Reactions    Aspirin        Past Medical History:   Diagnosis Date    Elevated PSA     Hypertension     Malignant neoplasm of prostate        Past Surgical History:   Procedure Laterality Date    BIOPSY WITH TRANSRECTAL ULTRASOUND (TRUS) GUIDANCE Bilateral 04/11/2019    [place] 4+3=7        Family History   Problem Relation Age of Onset    Hypertension Father     Hypertension Mother     Diabetes Mother     Prostate cancer Brother     Cervical cancer Daughter     Hypertension Daughter     Lupus Son          Current Outpatient Medications:     acetaminophen (TYLENOL) 500 MG tablet, Take 500 mg by mouth every 6 (six) hours as needed for Pain., Disp: , Rfl:     amLODIPine (NORVASC) 5 MG tablet, Take 1 tablet (5 mg total) by mouth 2 (two) times daily., Disp: 180 tablet, Rfl: 3    ascorbic acid, vitamin C, (VITAMIN C) 1000 MG tablet, Take 1,000 mg by mouth once daily., Disp: , Rfl:     atorvastatin (LIPITOR) 20 MG tablet, Take 1 tablet (20 mg total) by mouth every evening., Disp: 90 tablet, Rfl: 3    furosemide (LASIX) 20 MG tablet, Take 0.5 tablets (10  "mg total) by mouth once daily., Disp: 45 tablet, Rfl: 0    losartan (COZAAR) 50 MG tablet, Take 1 tablet (50 mg total) by mouth once daily., Disp: 90 tablet, Rfl: 3    Review of Systems   Constitutional:  Positive for fatigue. Negative for activity change, appetite change, chills, fever and unexpected weight change.   Respiratory:  Negative for cough and shortness of breath.    Cardiovascular:  Negative for chest pain, palpitations and leg swelling.   Gastrointestinal:  Negative for abdominal pain, blood in stool, change in bowel habit, constipation, diarrhea, nausea and vomiting.   Genitourinary:  Negative for difficulty urinating and dysuria.   Musculoskeletal:  Negative for arthralgias, gait problem and myalgias.        Swelling of right hand    Neurological:  Positive for weakness (generalized weakness). Negative for dizziness, light-headedness, headaches and coordination difficulties.   Psychiatric/Behavioral:  Negative for confusion, dysphoric mood and sleep disturbance. The patient is not nervous/anxious.        Objective     /73   Pulse 83   Resp 13   Ht 5' 6" (1.676 m)   Wt 57.2 kg (126 lb)   SpO2 98%   BMI 20.34 kg/m²     Physical Exam   Constitutional: He is oriented to person, place, and time. normal appearance. No distress.   HENT:   Head: Atraumatic.   Mouth/Throat: Mucous membranes are moist.   Cardiovascular: Normal rate and regular rhythm. Pulmonary:      Effort: Pulmonary effort is normal. No respiratory distress.      Breath sounds: Normal breath sounds. No wheezing, rhonchi or rales.     Abdominal: Soft. Normal appearance and bowel sounds are normal. He exhibits no distension. There is no abdominal tenderness.   Musculoskeletal:         General: Normal range of motion.      Right hand: Swelling present. No deformity, tenderness or bony tenderness. Normal range of motion. Normal pulse.      Cervical back: Neck supple.      Right lower leg: No edema.      Left lower leg: No edema. " "  Neurological: He is alert and oriented to person, place, and time. Gait normal.   Skin: Skin is warm and dry.   Psychiatric: His behavior is normal. Mood normal.       Lab Results   Component Value Date    WBC 8.00 01/15/2024    HGB 11.0 (L) 01/15/2024    HCT 33.9 (L) 01/15/2024    MCV 91.9 01/15/2024     01/15/2024       CMP  Sodium   Date Value Ref Range Status   03/26/2024 142 136 - 145 mmol/L Final     Potassium   Date Value Ref Range Status   03/26/2024 3.5 3.5 - 5.1 mmol/L Final     Chloride   Date Value Ref Range Status   03/26/2024 109 (H) 98 - 107 mmol/L Final     CO2   Date Value Ref Range Status   03/26/2024 26 21 - 32 mmol/L Final     Glucose   Date Value Ref Range Status   03/26/2024 142 (H) 74 - 106 mg/dL Final     BUN   Date Value Ref Range Status   03/26/2024 12 7 - 18 mg/dL Final     Creatinine   Date Value Ref Range Status   03/26/2024 0.87 0.70 - 1.30 mg/dL Final     Calcium   Date Value Ref Range Status   03/26/2024 10.0 8.5 - 10.1 mg/dL Final     Total Protein   Date Value Ref Range Status   08/14/2023 7.0 6.4 - 8.2 g/dL Final     Albumin   Date Value Ref Range Status   08/14/2023 3.9 3.5 - 5.0 g/dL Final     Bilirubin, Total   Date Value Ref Range Status   08/14/2023 0.7 >0.0 - 1.2 mg/dL Final     Alk Phos   Date Value Ref Range Status   08/14/2023 65 45 - 115 U/L Final     AST   Date Value Ref Range Status   08/14/2023 20 15 - 37 U/L Final     ALT   Date Value Ref Range Status   08/14/2023 28 16 - 61 U/L Final     Anion Gap   Date Value Ref Range Status   03/26/2024 11 7 - 16 mmol/L Final     eGFR    Date Value Ref Range Status   11/12/2021 110 >=60 mL/min/1.73m² Final     eGFR   Date Value Ref Range Status   04/04/2022 78 >=60 mL/min/1.73m² Final     No results found for: "TSH"  Lab Results   Component Value Date    CHOL 134 09/12/2022    CHOL 117 07/16/2021     Lab Results   Component Value Date    HDL 64 (H) 09/12/2022    HDL 63 (H) 07/16/2021     Lab Results " "  Component Value Date    LDLCALC 53 09/12/2022    LDLCALC 38 07/16/2021     Lab Results   Component Value Date    TRIG 86 09/12/2022    TRIG 78 07/16/2021     Lab Results   Component Value Date    CHOLHDL 2.1 09/12/2022    CHOLHDL 1.9 07/16/2021     No results found for: "LABA1C", "HGBA1C"      Assessment and Plan   Edema of both lower extremities  -     furosemide (LASIX) 20 MG tablet; Take 0.5 tablets (10 mg total) by mouth once daily.  Dispense: 45 tablet; Refill: 0    Swelling of right hand  -     X-Ray Hand 2 View Right; Future; Expected date: 04/11/2024    Essential (primary) hypertension    Bradycardia    Mixed hyperlipidemia    Malignant neoplasm of prostate  Comments:  followed by Dr Chan, urology  Orders:  -     PSA, Total (Diagnostic)          Patient Instructions  Patient Instructions   Continue current medications   Follow up on 7/3/2024 and as needed, medication will be due again on 7/10/2024     Xray of right hand obtained will notify of results when available                             "

## 2024-04-11 ENCOUNTER — OFFICE VISIT (OUTPATIENT)
Dept: FAMILY MEDICINE | Facility: CLINIC | Age: 89
End: 2024-04-11
Payer: MEDICARE

## 2024-04-11 ENCOUNTER — APPOINTMENT (OUTPATIENT)
Dept: RADIOLOGY | Facility: CLINIC | Age: 89
End: 2024-04-11
Attending: NURSE PRACTITIONER
Payer: MEDICARE

## 2024-04-11 VITALS
OXYGEN SATURATION: 98 % | DIASTOLIC BLOOD PRESSURE: 73 MMHG | HEART RATE: 83 BPM | RESPIRATION RATE: 13 BRPM | SYSTOLIC BLOOD PRESSURE: 121 MMHG | BODY MASS INDEX: 20.25 KG/M2 | WEIGHT: 126 LBS | HEIGHT: 66 IN

## 2024-04-11 DIAGNOSIS — R60.0 EDEMA OF BOTH LOWER EXTREMITIES: Primary | Chronic | ICD-10-CM

## 2024-04-11 DIAGNOSIS — I10 ESSENTIAL (PRIMARY) HYPERTENSION: Chronic | ICD-10-CM

## 2024-04-11 DIAGNOSIS — M79.89 SWELLING OF RIGHT HAND: ICD-10-CM

## 2024-04-11 DIAGNOSIS — C61 MALIGNANT NEOPLASM OF PROSTATE: Chronic | ICD-10-CM

## 2024-04-11 DIAGNOSIS — R00.1 BRADYCARDIA: Chronic | ICD-10-CM

## 2024-04-11 DIAGNOSIS — E78.2 MIXED HYPERLIPIDEMIA: Chronic | ICD-10-CM

## 2024-04-11 LAB — PSA SERPL-MCNC: 9.19 NG/ML

## 2024-04-11 PROCEDURE — 73120 X-RAY EXAM OF HAND: CPT | Mod: TC,RHCUB,FY,RT | Performed by: NURSE PRACTITIONER

## 2024-04-11 PROCEDURE — 99214 OFFICE O/P EST MOD 30 MIN: CPT | Mod: ,,, | Performed by: NURSE PRACTITIONER

## 2024-04-11 PROCEDURE — 84153 ASSAY OF PSA TOTAL: CPT | Mod: ,,, | Performed by: CLINICAL MEDICAL LABORATORY

## 2024-04-11 RX ORDER — FUROSEMIDE 20 MG/1
10 TABLET ORAL DAILY
Qty: 45 TABLET | Refills: 0 | Status: SHIPPED | OUTPATIENT
Start: 2024-04-11 | End: 2024-07-10

## 2024-04-11 NOTE — PATIENT INSTRUCTIONS
Continue current medications   Follow up on 7/3/2024 and as needed, medication will be due again on 7/10/2024     Xray of right hand obtained will notify of results when available

## 2024-04-12 DIAGNOSIS — M79.89 SWELLING OF RIGHT HAND: ICD-10-CM

## 2024-04-12 DIAGNOSIS — S69.91XS: Primary | ICD-10-CM

## 2024-04-29 ENCOUNTER — TELEPHONE (OUTPATIENT)
Dept: FAMILY MEDICINE | Facility: CLINIC | Age: 89
End: 2024-04-29
Payer: MEDICARE

## 2024-04-29 ENCOUNTER — OFFICE VISIT (OUTPATIENT)
Dept: UROLOGY | Facility: CLINIC | Age: 89
End: 2024-04-29
Payer: MEDICARE

## 2024-04-29 VITALS
DIASTOLIC BLOOD PRESSURE: 75 MMHG | HEIGHT: 66 IN | WEIGHT: 125 LBS | BODY MASS INDEX: 20.09 KG/M2 | SYSTOLIC BLOOD PRESSURE: 131 MMHG | HEART RATE: 65 BPM

## 2024-04-29 DIAGNOSIS — C61 MALIGNANT NEOPLASM OF PROSTATE: Primary | ICD-10-CM

## 2024-04-29 DIAGNOSIS — I10 ESSENTIAL HYPERTENSION: ICD-10-CM

## 2024-04-29 DIAGNOSIS — N32.0 BLADDER OUTLET OBSTRUCTION: ICD-10-CM

## 2024-04-29 DIAGNOSIS — R97.20 ELEVATED PSA: ICD-10-CM

## 2024-04-29 DIAGNOSIS — N41.1 CHRONIC PROSTATITIS: ICD-10-CM

## 2024-04-29 PROCEDURE — 99214 OFFICE O/P EST MOD 30 MIN: CPT | Mod: S$PBB,,, | Performed by: UROLOGY

## 2024-04-29 PROCEDURE — 99214 OFFICE O/P EST MOD 30 MIN: CPT | Mod: PBBFAC | Performed by: UROLOGY

## 2024-04-29 NOTE — PROGRESS NOTES
Subjective     Patient ID: Treasure Madsen is a 89 y.o. male.    Chief Complaint: Follow-up (6 month visit, PSA C61)       Chief Complaint: Follow-up (Six month PSA and visit. )     Prostate Cancer Follow-up  2019 17:57  Test comment: right and left prostate bx's      SURGICAL PATHOLOGY REPORT      PATIENT: TREASURE MADSEN CASE NUMBER: [id number]    1935 AGE: 84 yrs SEX: M ACCOUNT NUMBER: [id number]   RACE: Black UNIT NUMBER: [id number]   ATTENDING DATE COLLECTED: 2019   Irvin Chan M.D.   PHYSICIAN:   DATE RECEIVED: 2019   DATE REPORTED: 2019         DIAGNOSIS:   A. 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 Prostate, left midline, biopsies:   - PROSTATIC ADENOCARCINOMA, BRYAN SCORE 3+4=7, INVOLVING   APPROXIMATELY 15% OF SUBMITTED TISSUE   - High-grade prostatic intraepithelial neoplasia (HGPIN)   C. Prostate, right lateral, biopsy:   - Prostate tissue with a small focus of atypical glands   - High-grade prostatic intraepithelial neoplasia (HGPIN)   D. Prostate, right midline, biopsies:   - High-grade prostatic intraepithelial neoplasia (HGPIN)   - Chronic inflammation      COMMENTS: The diagnosis of carcinoma is supported by the failure of immunohistochemistry for high molecular weight cytokeratin to demonstrate basal cells in the atypical   glands (performed on parts A, B, C, and D). The atypical glands in part C appear to lack basal cells and are suspicious for malignancy, but do not meet the   cytological and architectural threshold for a diagnosis of malignancy. Dr. Gonzales has reviewed selected slides and agrees with the interpretation.      Of note, this diagnosis places this patient in prognostic grade group 2 (out of 5) of a newly proposed consensus grading scheme (see reference below).      Reference: Diego et al. The 2014  International Society of Urological Pathology (ISUP) Consensus Conference   on [place] Grading of Prostatic Carcinoma: Definition of Grading Patterns and Proposal for a New Grading System. Am J Surg Pathol. 2016 Feb;40(2):244-52.      MICROSCOPIC DESCRIPTION:   A microscopic examination has been performed.      Clinical History: Elevated PSA, PSA 10.800      Electronically Signed By:      Aaron Early MD   04/12/2019 14:32  ---------------------------     Urology History  Associated Symptoms:  Edema  Mr. Madsen is 84 years old. He was sent by Ms. Carloschanda because of PSA elevation. He had a very high normal PSA in 2012 but none between then and last year. Patient has mild  bladder outlet obstructive symptoms with IPSS score of 2, and does not feel he needs any help with the way he is voiding. Nocturia x2. No family history of prostate cancer. No history of urinary tract infections, hematuria, or any other previous  problems. Patient in with his daughter for follow-up of PSA elevation.  (February 7, 2019)     Patient returned for follow-up of PSA elevation to have another PSA drawn and for consideration of prostate biopsies. Patient in with his 2 daughters today. Stable with voiding without significant lower tract obstructive symptoms.  (April 11, 2019)     Mr. Madsen is in for first visit since his biopsies were made. I had recommended that he be managed conservatively because of his age. He was found to have prostate cancer on the biopsies and we had talked with his daughter about situation. Patient is doing well clinically and feels just as well as he did last time he was here when he had his biopsies.  He did not have his PSA drawn before he was seen. Patient in with his daughter, Kelley. (October 14, 2019)     Mr. Narayan has not been seen in nearly 1 year. He was in again today with his daughter Kelley. Patient feels he is voiding okay. No burning on urination and no visible blood in the urine. He  has not had any hospitalizations or major health problems since he was last seen in 11 months ago. Last appointment delayed because of the coronavirus problem.  (September 16, 2020)     PSA Results:  Past PSA Results   PSA was 14.400 on April 19, 2021  PSA  was 17.000 on 9/16/2020  PSA was 12.300 on October 14, 2019  PSA was 10.800 on April 11, 2019  PSA was 9.760 on 11/16/2018  PSA was 11.300 on 8/15/2018  PSA was 3.86 on October 9, 2012  ------------------------------------------------------------------------------------------------------------------------------------------------------------------------------------------------------------------------------------------------------------------------------------------------------------------------PSA was 16.500 on October 20, 2021  PSA was 19.800 on April 25, 2022  PSA was 20.000 on October 25, 2022  PSA was 21.300 on April 25, 2023  PSA was 24.200 on August 14, 2023  PSA was 39.400 on April 29, 2024     All the above notes are from the old clinic system except for the PSA done today which is down slightly from last time.  Patient doing okay clinically.  Nocturia x2 with no worsening bladder outlet obstructive symptoms.  He has actually gained 6 lb since last visit.  Patient in with his daughter. (April 19, 2021)     Mr. Madsen is in for 6 month follow-up of prostate cancer.  He had his PSA done and results are pending.  No new health issues that he is aware of.  8 lb weight loss since last visit.  he had gained 6 pounds last time.  Patient in with his daughter Jadyn.  (October 20, 2021)     Mr. Narayan is in for his 6 month follow-up for prostate cancer.  PSA has been drawn and is pending.  No worsening bladder outlet obstructive symptoms or any other  problems.  He had to have a trip to the emergency room in November because of a fall injuring his head but otherwise has had a good 6 months.  PSA pending.  (April 25, 2022)     Mr. Madsen is in for his 6  month follow-up of prostate cancer.  He has had a good 6 months in general without any worsening bladder outlet obstructive symptoms or any other health problems.  Continues to have nocturia 1-2 times nightly.  He has had no additional falls or any other problems.  Overall good 6 months.  PSA drawn and results pending.  [October 25, 2022]     Mr. Madsen is in for six-month follow-up of prostate cancer.  He has had no new health issues since he was here last time.  2 lb weight loss.  He still has nocturia a couple times nightly.  PSA drawn and results pending.  Patient in today with his daughter Jadyn. [April 25, 2023]     Mr. Madsen is in for six-month follow-up of prostate cancer.  PSA in August was up slightly to 24.200.  No problems with urination.  Had to come to the emergency room Friday because of an ulcer on his left foot that is presumably related to peripheral vascular disease but may have also rubbed it with a shoe.  He has had a 6 lb weight loss in the past 6 months.  No new issues otherwise. [October 30, 2023]     Mr. Askew is in for his follow-up of prostate cancer.  He had PSA drawn and results pending while he was here.  Clinically doing okay with no suggestion of any problems related to prostate cancer.  Has had no further problem with his foot.  Overall good 6 months.   Patient's daughter notified of PSA results by telephone.  PSA has gone up to 39.400.  I still recommend conservative treatment however, and would just recommend he be seen again in 6 months with another PSA.  [April 29, 2024]        Review of Systems       Objective     Physical Exam  Constitutional:       Appearance: Normal appearance. He is normal weight.   Genitourinary:     Prostate: Normal.      Rectum: Normal.      Comments: Prostate 25-30 g smooth firm and fairly symmetrical  Neurological:      Mental Status: He is alert.   Psychiatric:         Mood and Affect: Mood normal.         Behavior: Behavior normal.      Urinalysis revealed occasional pus cells.  Dipstick had 1+ protein, 2+ urobilinogen, pH 6.5 and specific gravity 1.020     Assessment and Plan     1. Malignant neoplasm of prostate  -     PSA, Total (Diagnostic); Future; Expected date: 10/29/2024    2. Elevated PSA    3. Chronic prostatitis    4. Bladder outlet obstruction    5. Essential hypertension        Patient seems to be doing very well clinically.  .In with his daughter Jadyn today.  PSA returned after patient left the office as 39.400.  He also had a PSA done 2 weeks ago that was reported as 9.190.  It is illogical about that discrepancy in the PSAs.  Unfortunately the system allows for a lot of PSAs to be done that are done when other providers lab studies are done rather than as ordered.  Blood work from 2 weeks ago is no longer available so that study can not be redone.  I think the accurate PSA is the 1 that is elevated at 39.400.  I do not think patient needs to start hormone treatment yet because I am afraid his stamina will be affected.  Patient understands I plan to retire at the end of June.  Follow-up with Mr. Celestine Crump in 6 months with PSA.  Patient's daughter Jadyn notified of the PSA results by telephone and understands plan to continue conservative management.        No follow-ups on file.

## 2024-04-29 NOTE — TELEPHONE ENCOUNTER
Called pt's daughter back. Daughter states pt's hand is still swollen and she wanted us to get him an appointment with ortho today because they were already going to be in [place] for a urology appointment. Daughter states pt had an appt with ortho but cancelled it because he felt like his hand was better, daughter states his hand is not any better and is still swollen. I instructed daughter to call ortho to see if he could be seen by them or if he would need a new referral and then let us know. Daughter states she understands.

## 2024-04-29 NOTE — TELEPHONE ENCOUNTER
----- Message from Edwina Salguero sent at 4/29/2024  8:09 AM CDT -----  Please call kurt del rio prisca [phone]

## 2024-04-30 NOTE — PATIENT INSTRUCTIONS
Patient seems to be doing very well clinically.  .In with his daughter Jadyn today.  PSA returned after patient left the office as 39.400.  He also had a PSA done 2 weeks ago that was reported as 9.190.  It is illogical about that discrepancy in the PSAs.  Unfortunately the system allows for a lot of PSAs to be done that are done when other providers lab studies are done rather than as ordered.  Blood work from 2 weeks ago is no longer available so that study can not be redone.  I think the accurate PSA is the 1 that is elevated at 39.400.  I do not think patient needs to start hormone treatment yet because I am afraid his stamina will be affected.  Patient understands I plan to retire at the end of June.  Follow-up with Mr. Celestine Crump in 6 months with PSA.  Patient's daughter Jadyn notified of the PSA results by telephone and understands plan to continue conservative management.

## 2024-05-06 DIAGNOSIS — S69.91XS: Primary | ICD-10-CM

## 2024-06-20 ENCOUNTER — OFFICE VISIT (OUTPATIENT)
Dept: FAMILY MEDICINE | Facility: CLINIC | Age: 89
End: 2024-06-20
Payer: MEDICARE

## 2024-06-20 VITALS
TEMPERATURE: 98 F | WEIGHT: 120.69 LBS | HEART RATE: 58 BPM | OXYGEN SATURATION: 99 % | RESPIRATION RATE: 13 BRPM | BODY MASS INDEX: 19.39 KG/M2 | DIASTOLIC BLOOD PRESSURE: 67 MMHG | SYSTOLIC BLOOD PRESSURE: 128 MMHG | HEIGHT: 66 IN

## 2024-06-20 DIAGNOSIS — C61 MALIGNANT NEOPLASM OF PROSTATE: Chronic | ICD-10-CM

## 2024-06-20 DIAGNOSIS — Z00.00 ENCOUNTER FOR SUBSEQUENT ANNUAL WELLNESS VISIT (AWV) IN MEDICARE PATIENT: Primary | ICD-10-CM

## 2024-06-20 DIAGNOSIS — E78.2 MIXED HYPERLIPIDEMIA: Chronic | ICD-10-CM

## 2024-06-20 DIAGNOSIS — I10 ESSENTIAL (PRIMARY) HYPERTENSION: Chronic | ICD-10-CM

## 2024-06-20 PROCEDURE — G0439 PPPS, SUBSEQ VISIT: HCPCS | Mod: ,,, | Performed by: NURSE PRACTITIONER

## 2024-06-20 NOTE — PATIENT INSTRUCTIONS
Counseling and Referral of Other Preventative  (Italic type indicates deductible and co-insurance are waived)    Patient Name: Gavin Madsen  Today's Date: 6/20/2024    Health Maintenance       Date Due Completion Date    Pneumococcal Vaccines (Age 65+) (1 of 2 - PCV) Never done ---    Shingles Vaccine (1 of 2) Never done ---    RSV Vaccine (Age 60+ and Pregnant patients) (1 - 1-dose 60+ series) Never done ---    COVID-19 Vaccine (3 - 2023-24 season) 09/01/2023 1/6/2022    PROSTATE-SPECIFIC ANTIGEN 04/29/2025 4/29/2024    Lipid Panel 09/12/2027 9/12/2022    TETANUS VACCINE 11/12/2031 11/12/2021        No orders of the defined types were placed in this encounter.      The following information is provided to all patients.  This information is to help you find resources for any of the problems found today that may be affecting your health:                  Living healthy guide: ms.gov    Understanding Diabetes: www.diabetes.org      Eating healthy: www.cdc.gov/healthyweight      CDC home safety checklist: www.cdc.gov/steadi/patient.html      Agency on Aging: ms.gov    Alcoholics anonymous (AA): www.aa.org      Physical Activity: www.kira.nih.gov/se7ggky      Tobacco use: ms.gov

## 2024-06-20 NOTE — PROGRESS NOTES
"  Gavin Madsen presented for a  Medicare AWV and comprehensive Health Risk Assessment today. The following components were reviewed and updated:    Medical history  Family History  Social history  Allergies and Current Medications  Health Risk Assessment  Health Maintenance  Care Team         ** See Completed Assessments for Annual Wellness Visit within the encounter summary.**         The following assessments were completed:  Living Situation  CAGE  Depression Screening  Timed Get Up and Go  Whisper Test  Cognitive Function Screening  Nutrition Screening  ADL Screening  PAQ Screening        Opioid documentation:      Patient does not have a current opioid prescription.        Vitals:    06/20/24 1020   BP: 128/67   BP Location: Left arm   Patient Position: Sitting   Pulse: (!) 58   Resp: 13   Temp: 97.7 °F (36.5 °C)   TempSrc: Temporal   SpO2: 99%   Weight: 54.7 kg (120 lb 11.2 oz)   Height: 5' 6" (1.676 m)     Body mass index is 19.48 kg/m².  Physical Exam      Constitutional: He is oriented to person, place, and time. normal appearance. No distress.   HENT: [place]  Head: Atraumatic.   Mouth/Throat: Mucous membranes are moist.   Cardiovascular: Normal rate and regular rhythm. Pulmonary:      Effort: Pulmonary effort is normal. No respiratory distress.      Breath sounds: Normal breath sounds. No wheezing, rhonchi or rales.      Abdominal: Soft. Normal appearance and bowel sounds are normal. He exhibits no distension. There is no abdominal tenderness.   Musculoskeletal:         General: Normal range of motion.      Right hand: Swelling present. No deformity, tenderness or bony tenderness. Normal range of motion. Normal pulse.      Cervical back: Neck supple.      Right lower leg: No edema.      Left lower leg: No edema.   Neurological: He is alert and oriented to person, place, and time. Gait normal.   Skin: Skin is warm and dry.   Psychiatric: His behavior is normal. Mood normal.    Diagnoses and health risks " identified today and associated recommendations/orders:    1. Encounter for subsequent annual wellness visit (AWV) in Medicare patient  Appointment for AWV in one year       2. Essential (primary) hypertension  Continue current medication, DASH diet, home blood pressure monitoring, physical activity 30 minutes on 5 days per week       3. Mixed hyperlipidemia  Continue current medication, low saturated fat low cholesterol heart healthy diet, physical exercise 30 minutes on five days per week      4. Malignant neoplasm of prostate  Keep follow up with urology as scheduled, followed by Dr Chan in the past     5. Body mass index (BMI) 19.9 or less, adult   low saturated fat low cholesterol heart healthy diet,      Provided Gavin with a 5-10 year written screening schedule and personal prevention plan. Recommendations were developed using the USPSTF age appropriate recommendations. Education, counseling, and referrals were provided as needed. After Visit Summary printed and given to patient which includes a list of additional screenings\tests needed.    Follow up for 1 year for Annual Wellness Visit.    Jeanette Mc, HANNAH    I offered to discuss advanced care planning, including how to pick a person who would make decisions for you if you were unable to make them for yourself, called a health care power of , and what kind of decisions you might make such as use of life sustaining treatments such as ventilators and tube feeding when faced with a life limiting illness recorded on a living will that they will need to know. (How you want to be cared for as you near the end of your natural life)     X Patient is interested in learning more about how to make advanced directives.  I provided them paperwork and offered to discuss this with them.

## 2024-07-08 NOTE — PROGRESS NOTES
Clinic note     Patient name: Gavin Madsen is a 89 y.o. male   Chief compliant   Chief Complaint   Patient presents with    Medication Refill     Pt here for med refill and follow up on edema. Pt states edema is better.       Subjective     History of present illness   In clinic for routine follow up and medication refills  He continues to have swelling to right hand, denies any pain, he states he has decided not to proceed with referral for hand specialist orthopedic at this time, will notify us if he decides to proceed     Past Medical History: HTN, bradycardia, prostate cancer      Cardiology: Dr Glez, last clinic note reviewed    Urology: Dr Chan                Social History     Tobacco Use    Smoking status: Never     Passive exposure: Never    Smokeless tobacco: Never   Substance Use Topics    Alcohol use: Never    Drug use: Never       Review of patient's allergies indicates:   Allergen Reactions    Aspirin        Past Medical History:   Diagnosis Date    Elevated PSA     Hypertension     Malignant neoplasm of prostate        Past Surgical History:   Procedure Laterality Date    BIOPSY WITH TRANSRECTAL ULTRASOUND (TRUS) GUIDANCE Bilateral 04/11/2019    Aylin 4+3=7        Family History   Problem Relation Name Age of Onset    Hypertension Father      Hypertension Mother      Diabetes Mother      Prostate cancer Brother      Cervical cancer Daughter      Hypertension Daughter      Lupus Son           Current Outpatient Medications:     acetaminophen (TYLENOL) 500 MG tablet, Take 500 mg by mouth every 6 (six) hours as needed for Pain., Disp: , Rfl:     amLODIPine (NORVASC) 5 MG tablet, Take 1 tablet (5 mg total) by mouth 2 (two) times daily., Disp: 180 tablet, Rfl: 3    atorvastatin (LIPITOR) 20 MG tablet, Take 1 tablet (20 mg total) by mouth every evening., Disp: 90 tablet, Rfl: 3    furosemide (LASIX) 20 MG tablet, Take 0.5 tablets (10 mg total) by mouth once daily., Disp: 45 tablet, Rfl: 0    losartan  "(COZAAR) 50 MG tablet, Take 1 tablet (50 mg total) by mouth once daily., Disp: 90 tablet, Rfl: 3    Review of Systems   Constitutional:  Negative for activity change, appetite change, chills, fatigue, fever and unexpected weight change.   Respiratory:  Negative for cough and shortness of breath.    Cardiovascular:  Negative for chest pain, palpitations and leg swelling.   Gastrointestinal:  Negative for abdominal pain, blood in stool, change in bowel habit, constipation, diarrhea, nausea and vomiting.   Genitourinary:  Negative for difficulty urinating and dysuria.   Musculoskeletal:  Negative for arthralgias, gait problem and myalgias.        Edema to right hand    Neurological:  Negative for dizziness, syncope, light-headedness, headaches and coordination difficulties.   Psychiatric/Behavioral:  Negative for confusion, dysphoric mood and sleep disturbance. The patient is not nervous/anxious.        Objective     /60   Pulse 60   Temp 98.2 °F (36.8 °C)   Resp 12   Ht 5' 6" (1.676 m)   Wt 53.1 kg (117 lb)   SpO2 98%   BMI 18.88 kg/m²     Physical Exam   Constitutional: He is oriented to person, place, and time. normal appearance. No distress.   HENT:   Head: Atraumatic.   Mouth/Throat: Mucous membranes are moist.   Cardiovascular: Regular rhythm. Bradycardia present. Pulmonary:      Effort: Pulmonary effort is normal. No respiratory distress.      Breath sounds: Normal breath sounds. No wheezing, rhonchi or rales.     Abdominal: Soft. Normal appearance and bowel sounds are normal. He exhibits no distension. There is no abdominal tenderness.   Musculoskeletal:         General: Normal range of motion.      Cervical back: Neck supple.      Right lower leg: No edema.      Left lower leg: No edema.      Comments: Edema to right hand and fingers   Neurological: He is alert and oriented to person, place, and time. Gait normal.   Skin: Skin is warm and dry.   Psychiatric: His behavior is normal. Mood normal. "     Narrative & Impression  EXAMINATION:  XR HAND 2 VIEW RIGHT     CLINICAL HISTORY:  Other specified soft tissue disorders     COMPARISON:  None     TECHNIQUE:  Frontal and lateral views of the right hand.     FINDINGS:  Widening of the scapholunate interval suspicious for sequela of ligamentous injury/laxity.  Mild-to-moderate scattered degenerative change of the interphalangeal joints.  No convincing acute fracture.     Impression:     As above.     Point of Service: [facility]        Electronically signed by:Geovanny Dash  Date:                                            04/11/2024  Time:                                           09:44           Exam Ended: 04/11/24 09:27 CDT           Lab Results   Component Value Date    WBC 8.00 01/15/2024    HGB 11.0 (L) 01/15/2024    HCT 33.9 (L) 01/15/2024    MCV 91.9 01/15/2024     01/15/2024       CMP  Sodium   Date Value Ref Range Status   03/26/2024 142 136 - 145 mmol/L Final     Potassium   Date Value Ref Range Status   03/26/2024 3.5 3.5 - 5.1 mmol/L Final     Chloride   Date Value Ref Range Status   03/26/2024 109 (H) 98 - 107 mmol/L Final     CO2   Date Value Ref Range Status   03/26/2024 26 21 - 32 mmol/L Final     Glucose   Date Value Ref Range Status   03/26/2024 142 (H) 74 - 106 mg/dL Final     BUN   Date Value Ref Range Status   03/26/2024 12 7 - 18 mg/dL Final     Creatinine   Date Value Ref Range Status   03/26/2024 0.87 0.70 - 1.30 mg/dL Final     Calcium   Date Value Ref Range Status   03/26/2024 10.0 8.5 - 10.1 mg/dL Final     Total Protein   Date Value Ref Range Status   08/14/2023 7.0 6.4 - 8.2 g/dL Final     Albumin   Date Value Ref Range Status   08/14/2023 3.9 3.5 - 5.0 g/dL Final     Bilirubin, Total   Date Value Ref Range Status   08/14/2023 0.7 >0.0 - 1.2 mg/dL Final     Alk Phos   Date Value Ref Range Status   08/14/2023 65 45 - 115 U/L Final     AST   Date Value Ref Range Status   08/14/2023 20 15 - 37 U/L Final     ALT  "  Date Value Ref Range Status   08/14/2023 28 16 - 61 U/L Final     Anion Gap   Date Value Ref Range Status   03/26/2024 11 7 - 16 mmol/L Final     eGFR    Date Value Ref Range Status   11/12/2021 110 >=60 mL/min/1.73m² Final     eGFR   Date Value Ref Range Status   04/04/2022 78 >=60 mL/min/1.73m² Final     No results found for: "TSH"  Lab Results   Component Value Date    CHOL 134 09/12/2022    CHOL 117 07/16/2021     Lab Results   Component Value Date    HDL 64 (H) 09/12/2022    HDL 63 (H) 07/16/2021     Lab Results   Component Value Date    LDLCALC 53 09/12/2022    LDLCALC 38 07/16/2021     Lab Results   Component Value Date    TRIG 86 09/12/2022    TRIG 78 07/16/2021     Lab Results   Component Value Date    CHOLHDL 2.1 09/12/2022    CHOLHDL 1.9 07/16/2021     No results found for: "LABA1C", "HGBA1C"      Assessment and Plan   Essential (primary) hypertension  -     Basic Metabolic Panel; Future; Expected date: 07/09/2024    Edema of both lower extremities  -     furosemide (LASIX) 20 MG tablet; Take 0.5 tablets (10 mg total) by mouth once daily.  Dispense: 45 tablet; Refill: 0  -     Basic Metabolic Panel; Future; Expected date: 07/09/2024    Bradycardia    Swelling of joint of right hand          Patient Instructions  Patient Instructions   Lab obtained in clinic today, we will notify you of results and any necessary changes to plan of care   Refills on routine medications   Follow up on 10/01/2024 and as needed; routine medication will be due 10/07/2024                                  "

## 2024-07-09 ENCOUNTER — OFFICE VISIT (OUTPATIENT)
Dept: FAMILY MEDICINE | Facility: CLINIC | Age: 89
End: 2024-07-09
Payer: MEDICARE

## 2024-07-09 VITALS
HEART RATE: 60 BPM | RESPIRATION RATE: 12 BRPM | OXYGEN SATURATION: 98 % | BODY MASS INDEX: 18.8 KG/M2 | SYSTOLIC BLOOD PRESSURE: 120 MMHG | DIASTOLIC BLOOD PRESSURE: 60 MMHG | WEIGHT: 117 LBS | HEIGHT: 66 IN | TEMPERATURE: 98 F

## 2024-07-09 DIAGNOSIS — Z71.89 COMPLEX CARE COORDINATION: ICD-10-CM

## 2024-07-09 DIAGNOSIS — R00.1 BRADYCARDIA: Chronic | ICD-10-CM

## 2024-07-09 DIAGNOSIS — R60.0 EDEMA OF BOTH LOWER EXTREMITIES: Chronic | ICD-10-CM

## 2024-07-09 DIAGNOSIS — I10 ESSENTIAL (PRIMARY) HYPERTENSION: Primary | Chronic | ICD-10-CM

## 2024-07-09 DIAGNOSIS — M25.441 SWELLING OF JOINT OF RIGHT HAND: Chronic | ICD-10-CM

## 2024-07-09 LAB
ANION GAP SERPL CALCULATED.3IONS-SCNC: 11 MMOL/L (ref 7–16)
BUN SERPL-MCNC: 18 MG/DL (ref 7–18)
BUN/CREAT SERPL: 20 (ref 6–20)
CALCIUM SERPL-MCNC: 9.4 MG/DL (ref 8.5–10.1)
CHLORIDE SERPL-SCNC: 111 MMOL/L (ref 98–107)
CO2 SERPL-SCNC: 25 MMOL/L (ref 21–32)
CREAT SERPL-MCNC: 0.88 MG/DL (ref 0.7–1.3)
EGFR (NO RACE VARIABLE) (RUSH/TITUS): 83 ML/MIN/1.73M2
GLUCOSE SERPL-MCNC: 92 MG/DL (ref 74–106)
POTASSIUM SERPL-SCNC: 3.6 MMOL/L (ref 3.5–5.1)
SODIUM SERPL-SCNC: 143 MMOL/L (ref 136–145)

## 2024-07-09 PROCEDURE — 80048 BASIC METABOLIC PNL TOTAL CA: CPT | Mod: ,,, | Performed by: CLINICAL MEDICAL LABORATORY

## 2024-07-09 PROCEDURE — 99214 OFFICE O/P EST MOD 30 MIN: CPT | Mod: ,,, | Performed by: NURSE PRACTITIONER

## 2024-07-09 RX ORDER — FUROSEMIDE 20 MG/1
10 TABLET ORAL DAILY
Qty: 45 TABLET | Refills: 0 | Status: SHIPPED | OUTPATIENT
Start: 2024-07-09 | End: 2024-10-07

## 2024-07-09 NOTE — PATIENT INSTRUCTIONS
Lab obtained in clinic today, we will notify you of results and any necessary changes to plan of care   Refills on routine medications   Follow up on 10/01/2024 and as needed; routine medication will be due 10/07/2024

## 2024-09-30 ENCOUNTER — TELEPHONE (OUTPATIENT)
Dept: FAMILY MEDICINE | Facility: CLINIC | Age: 89
End: 2024-09-30
Payer: MEDICARE

## 2024-09-30 NOTE — TELEPHONE ENCOUNTER
----- Message from Med Assistant Nielsen sent at 9/30/2024 10:56 AM CDT -----  Regarding: FW: med refill    ----- Message -----  From: Migdalia Villanueva  Sent: 9/30/2024  10:47 AM CDT  To: Flash Bernabe Rush  Subject: med refill                                       Needs BP meds refill called into [facility].  Thank you.

## 2024-10-02 ENCOUNTER — OFFICE VISIT (OUTPATIENT)
Dept: FAMILY MEDICINE | Facility: CLINIC | Age: 89
End: 2024-10-02
Payer: MEDICARE

## 2024-10-02 VITALS
HEIGHT: 66 IN | SYSTOLIC BLOOD PRESSURE: 130 MMHG | DIASTOLIC BLOOD PRESSURE: 72 MMHG | RESPIRATION RATE: 12 BRPM | OXYGEN SATURATION: 97 % | HEART RATE: 65 BPM | BODY MASS INDEX: 19.1 KG/M2 | WEIGHT: 118.88 LBS

## 2024-10-02 DIAGNOSIS — C61 MALIGNANT NEOPLASM OF PROSTATE: ICD-10-CM

## 2024-10-02 DIAGNOSIS — R00.1 BRADYCARDIA: Chronic | ICD-10-CM

## 2024-10-02 DIAGNOSIS — R60.0 EDEMA OF BOTH LOWER EXTREMITIES: Primary | Chronic | ICD-10-CM

## 2024-10-02 DIAGNOSIS — E78.2 MIXED HYPERLIPIDEMIA: Chronic | ICD-10-CM

## 2024-10-02 DIAGNOSIS — R53.1 GENERALIZED WEAKNESS: Chronic | ICD-10-CM

## 2024-10-02 DIAGNOSIS — I10 ESSENTIAL (PRIMARY) HYPERTENSION: Chronic | ICD-10-CM

## 2024-10-02 LAB
ALBUMIN SERPL BCP-MCNC: 4.1 G/DL (ref 3.5–5)
ALBUMIN/GLOB SERPL: 1.2 {RATIO}
ALP SERPL-CCNC: 74 U/L (ref 45–115)
ALT SERPL W P-5'-P-CCNC: 19 U/L (ref 16–61)
ANION GAP SERPL CALCULATED.3IONS-SCNC: 8 MMOL/L (ref 7–16)
AST SERPL W P-5'-P-CCNC: 21 U/L (ref 15–37)
BILIRUB SERPL-MCNC: 0.8 MG/DL (ref ?–1.2)
BUN SERPL-MCNC: 18 MG/DL (ref 7–18)
BUN/CREAT SERPL: 23 (ref 6–20)
CALCIUM SERPL-MCNC: 9.6 MG/DL (ref 8.5–10.1)
CHLORIDE SERPL-SCNC: 109 MMOL/L (ref 98–107)
CHOLEST SERPL-MCNC: 131 MG/DL (ref 0–200)
CHOLEST/HDLC SERPL: 1.7 {RATIO}
CO2 SERPL-SCNC: 28 MMOL/L (ref 21–32)
CREAT SERPL-MCNC: 0.79 MG/DL (ref 0.7–1.3)
EGFR (NO RACE VARIABLE) (RUSH/TITUS): 86 ML/MIN/1.73M2
GLOBULIN SER-MCNC: 3.3 G/DL (ref 2–4)
GLUCOSE SERPL-MCNC: 75 MG/DL (ref 74–106)
HDLC SERPL-MCNC: 76 MG/DL (ref 40–60)
LDLC SERPL CALC-MCNC: 44 MG/DL
NONHDLC SERPL-MCNC: 55 MG/DL
POTASSIUM SERPL-SCNC: 3.6 MMOL/L (ref 3.5–5.1)
PROT SERPL-MCNC: 7.4 G/DL (ref 6.4–8.2)
PSA SERPL-MCNC: 35.2 NG/ML
SODIUM SERPL-SCNC: 141 MMOL/L (ref 136–145)
TRIGL SERPL-MCNC: 56 MG/DL (ref 35–150)
VLDLC SERPL-MCNC: 11 MG/DL

## 2024-10-02 PROCEDURE — 80053 COMPREHEN METABOLIC PANEL: CPT | Mod: ,,, | Performed by: CLINICAL MEDICAL LABORATORY

## 2024-10-02 PROCEDURE — 80061 LIPID PANEL: CPT | Mod: ,,, | Performed by: CLINICAL MEDICAL LABORATORY

## 2024-10-02 PROCEDURE — 84153 ASSAY OF PSA TOTAL: CPT | Mod: ,,, | Performed by: CLINICAL MEDICAL LABORATORY

## 2024-10-02 PROCEDURE — 99214 OFFICE O/P EST MOD 30 MIN: CPT | Mod: ,,, | Performed by: NURSE PRACTITIONER

## 2024-10-02 RX ORDER — FUROSEMIDE 20 MG/1
10 TABLET ORAL DAILY
Qty: 45 TABLET | Refills: 0 | Status: SHIPPED | OUTPATIENT
Start: 2024-10-02 | End: 2024-12-31

## 2024-10-02 NOTE — PATIENT INSTRUCTIONS
Lab obtained in clinic today, we will notify you of results and any necessary changes to plan of care   Refills on routine medications   Follow up on  12/18/2024 and as needed; routine medication will be due 12/31/2024

## 2024-10-07 ENCOUNTER — TELEPHONE (OUTPATIENT)
Dept: UROLOGY | Facility: CLINIC | Age: 89
End: 2024-10-07
Payer: MEDICARE

## 2024-10-07 NOTE — TELEPHONE ENCOUNTER
----- Message from Heraclio Crump NP sent at 10/4/2024  1:20 PM CDT -----  Please notify patient that his psa went down some to 35.200 and was 39.400 at the last visit.  I spoke with Dr. Chan and he recommends continued conservative treatment with PSA monitoring for now, remind them to keep their follow up, thanks    Reported psa 35.200 and need to keep next appointment with HANNAH Brasher 10/29/2024 at 1:40

## 2024-10-29 ENCOUNTER — OFFICE VISIT (OUTPATIENT)
Dept: UROLOGY | Facility: CLINIC | Age: 89
End: 2024-10-29
Payer: MEDICARE

## 2024-10-29 VITALS
OXYGEN SATURATION: 98 % | DIASTOLIC BLOOD PRESSURE: 67 MMHG | SYSTOLIC BLOOD PRESSURE: 143 MMHG | HEIGHT: 66 IN | BODY MASS INDEX: 19.13 KG/M2 | WEIGHT: 119 LBS | HEART RATE: 67 BPM

## 2024-10-29 DIAGNOSIS — C61 MALIGNANT NEOPLASM OF PROSTATE: Primary | Chronic | ICD-10-CM

## 2024-10-29 DIAGNOSIS — R97.20 ELEVATED PSA: ICD-10-CM

## 2024-10-29 PROCEDURE — 99214 OFFICE O/P EST MOD 30 MIN: CPT | Mod: PBBFAC | Performed by: NURSE PRACTITIONER

## 2024-10-29 PROCEDURE — 99213 OFFICE O/P EST LOW 20 MIN: CPT | Mod: S$PBB,,, | Performed by: NURSE PRACTITIONER

## 2024-10-29 PROCEDURE — 99999 PR PBB SHADOW E&M-EST. PATIENT-LVL IV: CPT | Mod: PBBFAC,,, | Performed by: NURSE PRACTITIONER

## 2024-12-20 ENCOUNTER — OFFICE VISIT (OUTPATIENT)
Dept: FAMILY MEDICINE | Facility: CLINIC | Age: 89
End: 2024-12-20
Payer: MEDICARE

## 2024-12-20 VITALS
BODY MASS INDEX: 19.13 KG/M2 | OXYGEN SATURATION: 98 % | HEIGHT: 66 IN | WEIGHT: 119.06 LBS | SYSTOLIC BLOOD PRESSURE: 136 MMHG | DIASTOLIC BLOOD PRESSURE: 58 MMHG | HEART RATE: 58 BPM

## 2024-12-20 DIAGNOSIS — R60.0 EDEMA OF BOTH LOWER EXTREMITIES: Chronic | ICD-10-CM

## 2024-12-20 DIAGNOSIS — I10 ESSENTIAL (PRIMARY) HYPERTENSION: ICD-10-CM

## 2024-12-20 DIAGNOSIS — E78.5 HYPERLIPIDEMIA, UNSPECIFIED HYPERLIPIDEMIA TYPE: ICD-10-CM

## 2024-12-20 RX ORDER — LOSARTAN POTASSIUM 50 MG/1
50 TABLET ORAL DAILY
Qty: 90 TABLET | Refills: 0 | Status: SHIPPED | OUTPATIENT
Start: 2024-12-20

## 2024-12-20 RX ORDER — FUROSEMIDE 20 MG/1
10 TABLET ORAL DAILY
Qty: 45 TABLET | Refills: 0 | Status: SHIPPED | OUTPATIENT
Start: 2024-12-20 | End: 2025-03-20

## 2024-12-20 RX ORDER — AMLODIPINE BESYLATE 5 MG/1
5 TABLET ORAL 2 TIMES DAILY
Qty: 180 TABLET | Refills: 0 | Status: SHIPPED | OUTPATIENT
Start: 2024-12-20

## 2024-12-20 RX ORDER — ATORVASTATIN CALCIUM 20 MG/1
20 TABLET, FILM COATED ORAL NIGHTLY
Qty: 90 TABLET | Refills: 0 | Status: SHIPPED | OUTPATIENT
Start: 2024-12-20

## 2024-12-20 RX ORDER — LOSARTAN POTASSIUM 50 MG/1
50 TABLET ORAL DAILY
Qty: 90 TABLET | Refills: 3 | Status: CANCELLED | OUTPATIENT
Start: 2024-12-20

## 2024-12-20 NOTE — PROGRESS NOTES
Clinic note     Patient name: Gavin Madsen is a 89 y.o. male   Chief compliant   Chief Complaint   Patient presents with    Medication Refill       Subjective     History of present illness   In clinic for routine follow up and medication refills   Denies any acute concerns at present      Past Medical History: HTN, bradycardia, prostate cancer      Cardiology: Dr Glez, last clinic note reviewed    Urology: Dr Chan          Social History     Tobacco Use    Smoking status: Never     Passive exposure: Never    Smokeless tobacco: Never   Substance Use Topics    Alcohol use: Never    Drug use: Never       Review of patient's allergies indicates:   Allergen Reactions    Aspirin        Past Medical History:   Diagnosis Date    Elevated PSA     Hypertension     Malignant neoplasm of prostate        Past Surgical History:   Procedure Laterality Date    BIOPSY WITH TRANSRECTAL ULTRASOUND (TRUS) GUIDANCE Bilateral 04/11/2019    [place] 4+3=7        Family History   Problem Relation Name Age of Onset    Hypertension Father      Hypertension Mother      Diabetes Mother      Prostate cancer Brother      Cervical cancer Daughter      Hypertension Daughter      Lupus Son           Current Outpatient Medications:     acetaminophen (TYLENOL) 500 MG tablet, Take 500 mg by mouth every 6 (six) hours as needed for Pain., Disp: , Rfl:     amLODIPine (NORVASC) 5 MG tablet, Take 1 tablet (5 mg total) by mouth 2 (two) times daily., Disp: 180 tablet, Rfl: 0    atorvastatin (LIPITOR) 20 MG tablet, Take 1 tablet (20 mg total) by mouth every evening., Disp: 90 tablet, Rfl: 0    furosemide (LASIX) 20 MG tablet, Take 0.5 tablets (10 mg total) by mouth once daily., Disp: 45 tablet, Rfl: 0    losartan (COZAAR) 50 MG tablet, Take 1 tablet (50 mg total) by mouth once daily., Disp: 90 tablet, Rfl: 0    Review of Systems   Constitutional:  Negative for activity change, appetite change, chills, fatigue, fever and unexpected weight change.  "  Respiratory:  Negative for cough and shortness of breath.    Cardiovascular:  Positive for leg swelling. Negative for chest pain and palpitations.   Gastrointestinal:  Negative for abdominal pain, blood in stool, change in bowel habit, constipation, diarrhea, nausea and vomiting.   Genitourinary:  Negative for difficulty urinating and dysuria.   Musculoskeletal:  Positive for arthralgias. Negative for gait problem and myalgias.   Neurological:  Negative for dizziness, light-headedness, headaches and coordination difficulties.   Psychiatric/Behavioral:  Negative for confusion, dysphoric mood and sleep disturbance. The patient is not nervous/anxious.        Objective     BP (!) 136/58 (BP Location: Left arm, Patient Position: Sitting)   Pulse (!) 58   Ht 5' 6" (1.676 m)   Wt 54 kg (119 lb 0.8 oz)   SpO2 98%   BMI 19.21 kg/m²     Physical Exam   Constitutional: He is oriented to person, place, and time. normal appearance. No distress.   HENT:   Head: Atraumatic.   Mouth/Throat: Mucous membranes are moist.   Cardiovascular: Regular rhythm. Bradycardia present. Pulmonary:      Effort: Pulmonary effort is normal. No respiratory distress.      Breath sounds: Normal breath sounds. No wheezing, rhonchi or rales.     Abdominal: Soft. Normal appearance and bowel sounds are normal. He exhibits no distension. There is no abdominal tenderness.   Musculoskeletal:         General: Normal range of motion.      Cervical back: Neck supple.      Right lower leg: Edema present.      Left lower leg: Edema present.      Comments: Trace edema BLE    Neurological: He is alert and oriented to person, place, and time. Gait normal.   Skin: Skin is warm and dry.   Psychiatric: His behavior is normal. Mood normal.       Lab Results   Component Value Date    WBC 8.00 01/15/2024    HGB 11.0 (L) 01/15/2024    HCT 33.9 (L) 01/15/2024    MCV 91.9 01/15/2024     01/15/2024       CMP  Sodium   Date Value Ref Range Status   10/02/2024 141 " "136 - 145 mmol/L Final     Potassium   Date Value Ref Range Status   10/02/2024 3.6 3.5 - 5.1 mmol/L Final     Chloride   Date Value Ref Range Status   10/02/2024 109 (H) 98 - 107 mmol/L Final     CO2   Date Value Ref Range Status   10/02/2024 28 21 - 32 mmol/L Final     Glucose   Date Value Ref Range Status   10/02/2024 75 74 - 106 mg/dL Final     BUN   Date Value Ref Range Status   10/02/2024 18 7 - 18 mg/dL Final     Creatinine   Date Value Ref Range Status   10/02/2024 0.79 0.70 - 1.30 mg/dL Final     Calcium   Date Value Ref Range Status   10/02/2024 9.6 8.5 - 10.1 mg/dL Final     Total Protein   Date Value Ref Range Status   10/02/2024 7.4 6.4 - 8.2 g/dL Final     Albumin   Date Value Ref Range Status   10/02/2024 4.1 3.5 - 5.0 g/dL Final     Bilirubin, Total   Date Value Ref Range Status   10/02/2024 0.8 >0.0 - 1.2 mg/dL Final     Alk Phos   Date Value Ref Range Status   10/02/2024 74 45 - 115 U/L Final     AST   Date Value Ref Range Status   10/02/2024 21 15 - 37 U/L Final     ALT   Date Value Ref Range Status   10/02/2024 19 16 - 61 U/L Final     Anion Gap   Date Value Ref Range Status   10/02/2024 8 7 - 16 mmol/L Final     eGFR    Date Value Ref Range Status   11/12/2021 110 >=60 mL/min/1.73m² Final     eGFR   Date Value Ref Range Status   04/04/2022 78 >=60 mL/min/1.73m² Final     No results found for: "TSH"  Lab Results   Component Value Date    CHOL 131 10/02/2024    CHOL 134 09/12/2022    CHOL 117 07/16/2021     Lab Results   Component Value Date    HDL 76 (H) 10/02/2024    HDL 64 (H) 09/12/2022    HDL 63 (H) 07/16/2021     Lab Results   Component Value Date    LDLCALC 44 10/02/2024    LDLCALC 53 09/12/2022    LDLCALC 38 07/16/2021     Lab Results   Component Value Date    TRIG 56 10/02/2024    TRIG 86 09/12/2022    TRIG 78 07/16/2021     Lab Results   Component Value Date    CHOLHDL 1.7 10/02/2024    CHOLHDL 2.1 09/12/2022    CHOLHDL 1.9 07/16/2021     No results found for: "LABA1C", " ""HGBA1C"      Assessment and Plan   Essential (primary) hypertension  -     amLODIPine (NORVASC) 5 MG tablet; Take 1 tablet (5 mg total) by mouth 2 (two) times daily.  Dispense: 180 tablet; Refill: 0  -     losartan (COZAAR) 50 MG tablet; Take 1 tablet (50 mg total) by mouth once daily.  Dispense: 90 tablet; Refill: 0    Hyperlipidemia, unspecified hyperlipidemia type  -     atorvastatin (LIPITOR) 20 MG tablet; Take 1 tablet (20 mg total) by mouth every evening.  Dispense: 90 tablet; Refill: 0    Edema of both lower extremities  -     furosemide (LASIX) 20 MG tablet; Take 0.5 tablets (10 mg total) by mouth once daily.  Dispense: 45 tablet; Refill: 0          Patient Instructions  Patient Instructions   Refills provided on routine medication   Elevate lower extremities when possible   Follow up in clinic on 3/11/2025 and as needed, medication will be due again on 3/18/2025                                "

## 2024-12-20 NOTE — PATIENT INSTRUCTIONS
Refills provided on routine medication   Elevate lower extremities when possible   Follow up in clinic on 3/11/2025 and as needed, medication will be due again on 3/18/2025

## 2025-02-17 ENCOUNTER — OFFICE VISIT (OUTPATIENT)
Dept: CARDIOLOGY | Facility: CLINIC | Age: 89
End: 2025-02-17
Payer: MEDICARE

## 2025-02-17 VITALS
DIASTOLIC BLOOD PRESSURE: 62 MMHG | WEIGHT: 123 LBS | OXYGEN SATURATION: 98 % | HEART RATE: 63 BPM | SYSTOLIC BLOOD PRESSURE: 128 MMHG | HEIGHT: 66 IN | BODY MASS INDEX: 19.77 KG/M2

## 2025-02-17 DIAGNOSIS — R00.1 BRADYCARDIA: Primary | Chronic | ICD-10-CM

## 2025-02-17 DIAGNOSIS — I10 ESSENTIAL (PRIMARY) HYPERTENSION: ICD-10-CM

## 2025-02-17 PROCEDURE — 99213 OFFICE O/P EST LOW 20 MIN: CPT | Mod: PBBFAC | Performed by: INTERNAL MEDICINE

## 2025-02-17 PROCEDURE — 99999 PR PBB SHADOW E&M-EST. PATIENT-LVL III: CPT | Mod: PBBFAC,,, | Performed by: INTERNAL MEDICINE

## 2025-02-17 PROCEDURE — 99214 OFFICE O/P EST MOD 30 MIN: CPT | Mod: S$PBB,,, | Performed by: INTERNAL MEDICINE

## 2025-02-17 NOTE — PROGRESS NOTES
Cardiology Clinic Note:    PCP: Jeanette Mc FNP    REFERRING PHYSICIAN:     CHIEF COMPLAINT: Chest apoin    HISTORY OF PRESENT ILLNESS:  Gavin Madsen is a 89 y.o. male who presents for evaluation of bradycardia, remains asymptomatic, no syncope, palpitations or dizziness.  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    His wife and daughter at bedside report his is active, however has been slowing down over last several months.  He notes episodes of weakness and fatigue, come and go spontaneously, last seconds up to a minute, will sit down, symptoms usually resolve before he sits down, have resolved. . . He denies chest pain, pressure or shortness of breath.     Review of Systems:     Review of Systems   Constitutional:  Positive for malaise/fatigue and weight loss.   HENT: Negative.     Eyes: Negative.    Respiratory: Negative.     Cardiovascular: Negative.    Gastrointestinal: Negative.    Genitourinary: Negative.    Musculoskeletal: Negative.    Skin: Negative.    Neurological: Negative.    Endo/Heme/Allergies: Negative.    Psychiatric/Behavioral: Negative.          PAST MEDICAL HISTORY:  Past Medical History:   Diagnosis Date    Elevated PSA     Hypertension     Malignant neoplasm of prostate        PAST SURGICAL HISTORY:  Past Surgical History:   Procedure Laterality Date    BIOPSY WITH TRANSRECTAL ULTRASOUND (TRUS) GUIDANCE Bilateral 04/11/2019    [place] 4+3=7       SOCIAL HISTORY:  Social History     Socioeconomic History    Marital status: Single   Tobacco Use    Smoking status: Never     Passive exposure: Never    Smokeless tobacco: Never   Substance and Sexual Activity    Alcohol use: Never    Drug use: Never    Sexual activity: Not Currently     Social Drivers of Health     Financial Resource Strain: Low Risk  (6/20/2024)    Overall Financial Resource Strain (CARDIA)     Difficulty of Paying Living Expenses: Not hard at all   Food Insecurity: No Food Insecurity (6/20/2024)    Hunger Vital Sign      "Worried About Running Out of Food in the Last Year: Never true     Ran Out of Food in the Last Year: Never true   Transportation Needs: No Transportation Needs (6/20/2024)    PRAPARE - Transportation     Lack of Transportation (Medical): No     Lack of Transportation (Non-Medical): No   Physical Activity: Unknown (6/20/2024)    Exercise Vital Sign     Days of Exercise per Week: 0 days   Stress: No Stress Concern Present (6/20/2024)    [employer] - Occupational Stress Questionnaire     Feeling of Stress : Not at all   Housing Stability: Unknown (6/20/2024)    Housing Stability Vital Sign     Unable to Pay for Housing in the Last Year: No     Homeless in the Last Year: No       FAMILY HISTORY:  Family History   Problem Relation Name Age of Onset    Hypertension Father      Hypertension Mother      Diabetes Mother      Prostate cancer Brother      Cervical cancer Daughter      Hypertension Daughter      Lupus Son         ALLERGIES:  Allergies as of 02/17/2025 - Reviewed 02/17/2025   Allergen Reaction Noted    Aspirin  04/19/2021         MEDICATIONS:  Current Outpatient Medications on File Prior to Visit   Medication Sig Dispense Refill    acetaminophen (TYLENOL) 500 MG tablet Take 500 mg by mouth every 6 (six) hours as needed for Pain.      amLODIPine (NORVASC) 5 MG tablet Take 1 tablet (5 mg total) by mouth 2 (two) times daily. 180 tablet 0    atorvastatin (LIPITOR) 20 MG tablet Take 1 tablet (20 mg total) by mouth every evening. 90 tablet 0    furosemide (LASIX) 20 MG tablet Take 0.5 tablets (10 mg total) by mouth once daily. 45 tablet 0    losartan (COZAAR) 50 MG tablet Take 1 tablet (50 mg total) by mouth once daily. 90 tablet 0     No current facility-administered medications on file prior to visit.          PHYSICAL EXAM:  Blood pressure 128/62, pulse 63, height 5' 6" (1.676 m), weight 55.8 kg (123 lb), SpO2 98%.  Wt Readings from Last 3 Encounters:   02/17/25 55.8 kg (123 lb) "   12/20/24 54 kg (119 lb 0.8 oz)   10/29/24 54 kg (119 lb)      Body mass index is 19.85 kg/m².    Physical Exam  Constitutional:       Appearance: Normal appearance. He is normal weight.   HENT:      Head: Normocephalic and atraumatic.      Right Ear: External ear normal.      Left Ear: External ear normal.      Mouth/Throat:      Mouth: Mucous membranes are moist.   Eyes:      Extraocular Movements: Extraocular movements intact.      Conjunctiva/sclera: Conjunctivae normal.      Pupils: Pupils are equal, round, and reactive to light.   Neck:      Vascular: Carotid bruit present.   Cardiovascular:      Rate and Rhythm: Regular rhythm. Bradycardia present.      Pulses: Normal pulses.      Heart sounds: No murmur heard.  Pulmonary:      Effort: Pulmonary effort is normal.      Breath sounds: Normal breath sounds.   Abdominal:      General: Abdomen is flat. Bowel sounds are normal.      Palpations: Abdomen is soft.   Musculoskeletal:         General: Normal range of motion.   Skin:     General: Skin is warm and dry.   Neurological:      General: No focal deficit present.      Mental Status: He is alert.          LABS REVIEWED:  Lab Results   Component Value Date    WBC 8.00 01/15/2024    RBC 3.69 (L) 01/15/2024    HGB 11.0 (L) 01/15/2024    HCT 33.9 (L) 01/15/2024    MCV 91.9 01/15/2024    MCH 29.8 01/15/2024    MCHC 32.4 01/15/2024    RDW 12.9 01/15/2024     01/15/2024    MPV 9.9 01/15/2024    NRBC 0.0 08/14/2023     Lab Results   Component Value Date     10/02/2024    K 3.6 10/02/2024     (H) 10/02/2024    CO2 28 10/02/2024    BUN 18 10/02/2024    MG 2.5 (H) 08/14/2023     Lab Results   Component Value Date    AST 21 10/02/2024    ALT 19 10/02/2024     Lab Results   Component Value Date    GLU 75 10/02/2024     Lab Results   Component Value Date    CHOL 131 10/02/2024    HDL 76 (H) 10/02/2024    TRIG 56 10/02/2024    CHOLHDL 1.7 10/02/2024       CARDIAC STUDIES REVIEWED:    OTHER IMAGING STUDIES  REVIEWED:        ASSESSMENT:   Bradycardia  -     EKG 12-lead; Future          PLAN:  1. Bradycardia: mild sinus joey on Zio, short runs of asymptomatic SVT , up to four beats, no intervention indicated, remains asymptomatic  . 2. Fatigue, normal l LV function, does not appear to be cardiac mediated.   3. Hypertension: much better controlled off Normodyne, on Losartin 50 mg q d.   4. Hyperlipidemia: on Lipitor, at goal on lipid panel.       No aspirin due to true allergy       Follow up six months, sooner if symptoms change.

## 2025-02-21 ENCOUNTER — TELEPHONE (OUTPATIENT)
Dept: CARDIOLOGY | Facility: CLINIC | Age: 89
End: 2025-02-21
Payer: MEDICARE

## 2025-02-21 NOTE — TELEPHONE ENCOUNTER
Daughter has called multiple times today stating pt never received his medicine at the pharmacy. Last phone call she stated she called the pharmacy and they stated they did not have it called it. RX was left on pharmacy  yesterday. Spoke with [facility] and they said RX for losartan 100mg was picked up by patient at 09:36 this morning. Attempted to call both daughters and did not get an answer. Leona spoke with daughter and let her know RX was picked up by pt.

## 2025-02-24 ENCOUNTER — TELEPHONE (OUTPATIENT)
Dept: CARDIOLOGY | Facility: CLINIC | Age: 89
End: 2025-02-24
Payer: MEDICARE

## 2025-02-24 RX ORDER — LOSARTAN POTASSIUM 100 MG/1
100 TABLET ORAL DAILY
Qty: 90 TABLET | Refills: 1 | Status: SHIPPED | OUTPATIENT
Start: 2025-02-24

## 2025-02-24 NOTE — TELEPHONE ENCOUNTER
Left msg for pt concerning appt with  on 3/11/25. Due to change in provider schedule,  will not be in clinic on this date. Needing to reschedule pt appt.

## 2025-03-17 ENCOUNTER — OFFICE VISIT (OUTPATIENT)
Dept: CARDIOLOGY | Facility: CLINIC | Age: 89
End: 2025-03-17
Payer: MEDICARE

## 2025-03-17 VITALS
DIASTOLIC BLOOD PRESSURE: 78 MMHG | SYSTOLIC BLOOD PRESSURE: 118 MMHG | WEIGHT: 121 LBS | HEART RATE: 54 BPM | HEIGHT: 66 IN | OXYGEN SATURATION: 96 % | BODY MASS INDEX: 19.44 KG/M2

## 2025-03-17 DIAGNOSIS — R00.1 BRADYCARDIA: Chronic | ICD-10-CM

## 2025-03-17 DIAGNOSIS — I10 ESSENTIAL (PRIMARY) HYPERTENSION: Primary | Chronic | ICD-10-CM

## 2025-03-17 DIAGNOSIS — E78.2 MIXED HYPERLIPIDEMIA: Chronic | ICD-10-CM

## 2025-03-17 DIAGNOSIS — R60.0 EDEMA OF BOTH LOWER EXTREMITIES: Chronic | ICD-10-CM

## 2025-03-17 PROCEDURE — 99213 OFFICE O/P EST LOW 20 MIN: CPT | Mod: S$PBB,,, | Performed by: NURSE PRACTITIONER

## 2025-03-17 PROCEDURE — 99214 OFFICE O/P EST MOD 30 MIN: CPT | Mod: PBBFAC | Performed by: NURSE PRACTITIONER

## 2025-03-17 PROCEDURE — 99999 PR PBB SHADOW E&M-EST. PATIENT-LVL IV: CPT | Mod: PBBFAC,,, | Performed by: NURSE PRACTITIONER

## 2025-03-20 ENCOUNTER — OFFICE VISIT (OUTPATIENT)
Dept: FAMILY MEDICINE | Facility: CLINIC | Age: 89
End: 2025-03-20
Payer: MEDICARE

## 2025-03-20 VITALS
HEIGHT: 66 IN | BODY MASS INDEX: 19.56 KG/M2 | DIASTOLIC BLOOD PRESSURE: 70 MMHG | SYSTOLIC BLOOD PRESSURE: 139 MMHG | OXYGEN SATURATION: 98 % | HEART RATE: 56 BPM | RESPIRATION RATE: 12 BRPM | WEIGHT: 121.69 LBS

## 2025-03-20 DIAGNOSIS — R60.0 EDEMA OF BOTH LOWER EXTREMITIES: Chronic | ICD-10-CM

## 2025-03-20 DIAGNOSIS — C61 MALIGNANT NEOPLASM OF PROSTATE: Chronic | ICD-10-CM

## 2025-03-20 DIAGNOSIS — R00.1 BRADYCARDIA: Chronic | ICD-10-CM

## 2025-03-20 DIAGNOSIS — Z23 NEED FOR PNEUMOCOCCAL 20-VALENT CONJUGATE VACCINATION: ICD-10-CM

## 2025-03-20 DIAGNOSIS — E78.5 HYPERLIPIDEMIA, UNSPECIFIED HYPERLIPIDEMIA TYPE: Chronic | ICD-10-CM

## 2025-03-20 DIAGNOSIS — I10 ESSENTIAL (PRIMARY) HYPERTENSION: Primary | ICD-10-CM

## 2025-03-20 LAB
ANION GAP SERPL CALCULATED.3IONS-SCNC: 12 MMOL/L (ref 7–16)
BASOPHILS # BLD AUTO: 0.03 K/UL (ref 0–0.2)
BASOPHILS NFR BLD AUTO: 0.7 % (ref 0–1)
BUN SERPL-MCNC: 15 MG/DL (ref 8–26)
BUN/CREAT SERPL: 18 (ref 6–20)
CALCIUM SERPL-MCNC: 9.2 MG/DL (ref 8.8–10)
CHLORIDE SERPL-SCNC: 108 MMOL/L (ref 98–107)
CO2 SERPL-SCNC: 25 MMOL/L (ref 23–31)
CREAT SERPL-MCNC: 0.82 MG/DL (ref 0.72–1.25)
DIFFERENTIAL METHOD BLD: ABNORMAL
EGFR (NO RACE VARIABLE) (RUSH/TITUS): 84 ML/MIN/1.73M2
EOSINOPHIL # BLD AUTO: 0.19 K/UL (ref 0–0.5)
EOSINOPHIL NFR BLD AUTO: 4.6 % (ref 1–4)
ERYTHROCYTE [DISTWIDTH] IN BLOOD BY AUTOMATED COUNT: 13.2 % (ref 11.5–14.5)
GLUCOSE SERPL-MCNC: 88 MG/DL (ref 82–115)
HCT VFR BLD AUTO: 39.2 % (ref 40–54)
HGB BLD-MCNC: 12 G/DL (ref 13.5–18)
IMM GRANULOCYTES # BLD AUTO: 0.01 K/UL (ref 0–0.04)
IMM GRANULOCYTES NFR BLD: 0.2 % (ref 0–0.4)
LYMPHOCYTES # BLD AUTO: 1.4 K/UL (ref 1–4.8)
LYMPHOCYTES NFR BLD AUTO: 34.1 % (ref 27–41)
MCH RBC QN AUTO: 29.3 PG (ref 27–31)
MCHC RBC AUTO-ENTMCNC: 30.6 G/DL (ref 32–36)
MCV RBC AUTO: 95.6 FL (ref 80–96)
MONOCYTES # BLD AUTO: 0.35 K/UL (ref 0–0.8)
MONOCYTES NFR BLD AUTO: 8.5 % (ref 2–6)
MPC BLD CALC-MCNC: 11.9 FL (ref 9.4–12.4)
NEUTROPHILS # BLD AUTO: 2.12 K/UL (ref 1.8–7.7)
NEUTROPHILS NFR BLD AUTO: 51.9 % (ref 53–65)
NRBC # BLD AUTO: 0 X10E3/UL
NRBC, AUTO (.00): 0 %
PLATELET # BLD AUTO: 132 K/UL (ref 150–400)
POTASSIUM SERPL-SCNC: 4.2 MMOL/L (ref 3.5–5.1)
RBC # BLD AUTO: 4.1 M/UL (ref 4.6–6.2)
SODIUM SERPL-SCNC: 141 MMOL/L (ref 136–145)
WBC # BLD AUTO: 4.1 K/UL (ref 4.5–11)

## 2025-03-20 PROCEDURE — 80048 BASIC METABOLIC PNL TOTAL CA: CPT | Mod: ,,, | Performed by: CLINICAL MEDICAL LABORATORY

## 2025-03-20 PROCEDURE — 85025 COMPLETE CBC W/AUTO DIFF WBC: CPT | Mod: ,,, | Performed by: CLINICAL MEDICAL LABORATORY

## 2025-03-20 PROCEDURE — 99214 OFFICE O/P EST MOD 30 MIN: CPT | Mod: ,,, | Performed by: NURSE PRACTITIONER

## 2025-03-20 PROCEDURE — 90677 PCV20 VACCINE IM: CPT | Mod: ,,, | Performed by: NURSE PRACTITIONER

## 2025-03-20 PROCEDURE — G0009 ADMIN PNEUMOCOCCAL VACCINE: HCPCS | Mod: ,,, | Performed by: NURSE PRACTITIONER

## 2025-03-20 RX ORDER — FUROSEMIDE 20 MG/1
10 TABLET ORAL DAILY
Qty: 45 TABLET | Refills: 0 | Status: SHIPPED | OUTPATIENT
Start: 2025-03-20 | End: 2025-06-18

## 2025-03-20 RX ORDER — ATORVASTATIN CALCIUM 20 MG/1
20 TABLET, FILM COATED ORAL NIGHTLY
Qty: 90 TABLET | Refills: 0 | Status: SHIPPED | OUTPATIENT
Start: 2025-03-20

## 2025-03-20 NOTE — PROGRESS NOTES
Clinic note     Patient name: Gavin Madsen is a 89 y.o. male   Chief compliant   Chief Complaint   Patient presents with    Medication Refill    Health Maintenance     Shingles Vaccine-declines, info sheet given to pt today  Pneumococcal Vaccines-ordered today  RSV Vaccine-declines, info sheet given to pt today  Influenza Vaccine-declines  COVID-19 Vaccine-declines       Subjective     History of present illness   In clinic for routine follow up and medication refills    Past Medical History: HTN, bradycardia, prostate cancer      Cardiology: Dr Glez, last clinic note reviewed    Urology: Roney OLIVA           Social History[1]    Review of patient's allergies indicates:   Allergen Reactions    Aspirin        Past Medical History:   Diagnosis Date    Elevated PSA     Hypertension     Malignant neoplasm of prostate        Past Surgical History:   Procedure Laterality Date    BIOPSY WITH TRANSRECTAL ULTRASOUND (TRUS) GUIDANCE Bilateral 04/11/2019    [place] 4+3=7        Family History   Problem Relation Name Age of Onset    Hypertension Father      Hypertension Mother      Diabetes Mother      Prostate cancer Brother      Cervical cancer Daughter      Hypertension Daughter      Lupus Son         Current Medications[2]    Review of Systems   Constitutional:  Positive for fatigue. Negative for activity change, appetite change, chills, fever and unexpected weight change.   Respiratory:  Negative for cough and shortness of breath.    Cardiovascular:  Positive for leg swelling. Negative for chest pain and palpitations.   Gastrointestinal:  Negative for abdominal pain, blood in stool, change in bowel habit, constipation, diarrhea, nausea and vomiting.   Genitourinary:  Negative for difficulty urinating and dysuria.   Musculoskeletal:  Negative for arthralgias, gait problem and myalgias.   Neurological:  Positive for weakness (intermittent episodes of generalized weakness). Negative for dizziness, light-headedness,  "headaches and coordination difficulties.   Psychiatric/Behavioral:  Negative for confusion, dysphoric mood and sleep disturbance. The patient is not nervous/anxious.        Objective     /70 (Patient Position: Sitting)   Pulse (!) 56   Resp 12   Ht 5' 6" (1.676 m)   Wt 55.2 kg (121 lb 11.2 oz)   SpO2 98%   BMI 19.64 kg/m²     Physical Exam   Constitutional: He is oriented to person, place, and time. normal appearance. No distress.   HENT:   Head: Atraumatic.   Mouth/Throat: Mucous membranes are moist.   Cardiovascular: Regular rhythm. Bradycardia present. Pulmonary:      Effort: Pulmonary effort is normal. No respiratory distress.      Breath sounds: Normal breath sounds. No wheezing, rhonchi or rales.     Abdominal: Soft. Normal appearance and bowel sounds are normal. He exhibits no distension. There is no abdominal tenderness.   Musculoskeletal:         General: Normal range of motion.      Cervical back: Neck supple.      Right lower leg: No edema.      Left lower leg: No edema.   Neurological: He is alert and oriented to person, place, and time. Gait normal.   Skin: Skin is warm and dry.   Psychiatric: His behavior is normal. Mood normal.       Lab Results   Component Value Date    WBC 8.00 01/15/2024    HGB 11.0 (L) 01/15/2024    HCT 33.9 (L) 01/15/2024    MCV 91.9 01/15/2024     01/15/2024       CMP  Sodium   Date Value Ref Range Status   10/02/2024 141 136 - 145 mmol/L Final     Potassium   Date Value Ref Range Status   10/02/2024 3.6 3.5 - 5.1 mmol/L Final     Chloride   Date Value Ref Range Status   10/02/2024 109 (H) 98 - 107 mmol/L Final     CO2   Date Value Ref Range Status   10/02/2024 28 21 - 32 mmol/L Final     Glucose   Date Value Ref Range Status   10/02/2024 75 74 - 106 mg/dL Final     BUN   Date Value Ref Range Status   10/02/2024 18 7 - 18 mg/dL Final     Creatinine   Date Value Ref Range Status   10/02/2024 0.79 0.70 - 1.30 mg/dL Final     Calcium   Date Value Ref Range Status " "  10/02/2024 9.6 8.5 - 10.1 mg/dL Final     Total Protein   Date Value Ref Range Status   10/02/2024 7.4 6.4 - 8.2 g/dL Final     Albumin   Date Value Ref Range Status   10/02/2024 4.1 3.5 - 5.0 g/dL Final     Bilirubin, Total   Date Value Ref Range Status   10/02/2024 0.8 >0.0 - 1.2 mg/dL Final     Alk Phos   Date Value Ref Range Status   10/02/2024 74 45 - 115 U/L Final     AST   Date Value Ref Range Status   10/02/2024 21 15 - 37 U/L Final     ALT   Date Value Ref Range Status   10/02/2024 19 16 - 61 U/L Final     Anion Gap   Date Value Ref Range Status   10/02/2024 8 7 - 16 mmol/L Final     eGFR    Date Value Ref Range Status   11/12/2021 110 >=60 mL/min/1.73m² Final     eGFR   Date Value Ref Range Status   04/04/2022 78 >=60 mL/min/1.73m² Final     No results found for: "TSH"  Lab Results   Component Value Date    CHOL 131 10/02/2024    CHOL 134 09/12/2022    CHOL 117 07/16/2021     Lab Results   Component Value Date    HDL 76 (H) 10/02/2024    HDL 64 (H) 09/12/2022    HDL 63 (H) 07/16/2021     Lab Results   Component Value Date    LDLCALC 44 10/02/2024    LDLCALC 53 09/12/2022    LDLCALC 38 07/16/2021     Lab Results   Component Value Date    TRIG 56 10/02/2024    TRIG 86 09/12/2022    TRIG 78 07/16/2021     Lab Results   Component Value Date    CHOLHDL 1.7 10/02/2024    CHOLHDL 2.1 09/12/2022    CHOLHDL 1.9 07/16/2021     No results found for: "LABA1C", "HGBA1C"    Health Maintenance Due   Topic Date Due    Shingles Vaccine (1 of 2) Never done    RSV Vaccine (Age 60+ and Pregnant patients) (1 - 1-dose 75+ series) Never done    Influenza Vaccine (1) 09/01/2024    COVID-19 Vaccine (3 - 2024-25 season) 09/01/2024         Health Maintenance Topics with due status: Not Due       Topic Last Completion Date    TETANUS VACCINE 11/12/2021    PROSTATE-SPECIFIC ANTIGEN 10/02/2024    Lipid Panel 10/02/2024         Assessment and Plan   Essential (primary) hypertension  -     Basic Metabolic Panel; Future; " Expected date: 03/20/2025  -     CBC Auto Differential; Future; Expected date: 03/20/2025    Edema of both lower extremities  -     furosemide (LASIX) 20 MG tablet; Take 0.5 tablets (10 mg total) by mouth once daily.  Dispense: 45 tablet; Refill: 0    Hyperlipidemia, unspecified hyperlipidemia type  -     atorvastatin (LIPITOR) 20 MG tablet; Take 1 tablet (20 mg total) by mouth every evening.  Dispense: 90 tablet; Refill: 0    Bradycardia  Comments:  Cardiology: Dr Glez    Malignant neoplasm of prostate  Comments:  Urology: Roney OLIVA  10/2/2024 PSA 35.200 repeated Q6 months    Need for pneumococcal 20-valent conjugate vaccination  -     pneumoc 20-frida conj-dip cr(PF) (PREVNAR-20 (PF)) injection Syrg 0.5 mL          Patient Instructions  Patient Instructions   Lab obtained in clinic today, we will notify you of results and any necessary changes to plan of care   Refills on routine medications   Follow up on 6/11/25  and as needed; routine medication will be due 6/18/25                                       [1]   Social History  Tobacco Use    Smoking status: Never     Passive exposure: Never    Smokeless tobacco: Never   Substance Use Topics    Alcohol use: Never    Drug use: Never   [2]   Current Outpatient Medications:     acetaminophen (TYLENOL) 500 MG tablet, Take 500 mg by mouth every 6 (six) hours as needed for Pain., Disp: , Rfl:     atorvastatin (LIPITOR) 20 MG tablet, Take 1 tablet (20 mg total) by mouth every evening., Disp: 90 tablet, Rfl: 0    furosemide (LASIX) 20 MG tablet, Take 0.5 tablets (10 mg total) by mouth once daily., Disp: 45 tablet, Rfl: 0    losartan (COZAAR) 100 MG tablet, Take 1 tablet (100 mg total) by mouth once daily., Disp: 90 tablet, Rfl: 1  No current facility-administered medications for this visit.

## 2025-03-20 NOTE — PATIENT INSTRUCTIONS
Lab obtained in clinic today, we will notify you of results and any necessary changes to plan of care   Refills on routine medications   Follow up on 6/11/25  and as needed; routine medication will be due 6/18/25

## 2025-03-21 NOTE — PROGRESS NOTES
PCP: Jeanette Mc FNP    Referring Provider:     Subjective:   Gavin Madsen is a 89 y.o. male with hx of HTN, HLD, bradycardia who presents for follow up.     Pt was seen by Dr. Glez here in cardiology last month. His amlodipine was discontinued and losartan was increased from 50mg to 100mg daily. Pt states he is feeling better and denies any cardiac complaints except mild edema to BLE at the end of the day, resolves overnight. EF 60-65% per echo in 2023. He has mild, asymptomatic bradycardia.       Fhx: no premature CAD  Shx: Never smoker, no ETOH or drug use    EKG   Results for orders placed or performed in visit on 02/17/25   EKG 12-lead    Collection Time: 02/17/25  1:52 PM   Result Value Ref Range    QRS Duration 82 ms    [facility] QTC Calculation 377 ms    Narrative    Test Reason : R00.1,    Vent. Rate :  62 BPM     Atrial Rate :  62 BPM     P-R Int : 176 ms          QRS Dur :  82 ms      QT Int : 372 ms       P-R-T Axes :  96   8  59 degrees    QTcB Int : 377 ms    Normal sinus rhythm  Normal ECG  When compared with ECG of 25-Mar-2024 13:26,  No significant change was found  Confirmed by Shon Glez ([id number]) on 3/3/2025 10:17:31 AM    Referred By:            Confirmed By: Shon Glez     ECHO Results for orders placed during the hospital encounter of 08/25/23    Echo    Interpretation Summary    Left Ventricle: The left ventricle is normal in size. Normal wall thickness. Normal wall motion. There is normal systolic function with a visually estimated ejection fraction of 60 - 65%. There is normal diastolic function.    Right Ventricle: Mild right ventricular enlargement. Systolic function is normal.    Right Atrium: Right atrium is mildly dilated.    Mitral Valve: There is no stenosis.    Tricuspid Valve: There is mild regurgitation.    IVC/SVC: Normal venous pressure at 3 mmHg.    Pericardium: There is a trivial effusion. No indication of cardiac tamponade.    [facility] No results found for this or any previous  "visit.        Lab Results   Component Value Date     03/20/2025    K 4.2 03/20/2025     (H) 03/20/2025    CO2 25 03/20/2025    BUN 15 03/20/2025    CREATININE 0.82 03/20/2025    CALCIUM 9.2 03/20/2025    ANIONGAP 12 03/20/2025    ESTGFRAFRICA 110 11/12/2021    EGFRNONAA 78 04/04/2022       Lab Results   Component Value Date    CHOL 131 10/02/2024    CHOL 134 09/12/2022    CHOL 117 07/16/2021     Lab Results   Component Value Date    HDL 76 (H) 10/02/2024    HDL 64 (H) 09/12/2022    HDL 63 (H) 07/16/2021     Lab Results   Component Value Date    LDLCALC 44 10/02/2024    LDLCALC 53 09/12/2022    LDLCALC 38 07/16/2021     Lab Results   Component Value Date    TRIG 56 10/02/2024    TRIG 86 09/12/2022    TRIG 78 07/16/2021     Lab Results   Component Value Date    CHOLHDL 1.7 10/02/2024    CHOLHDL 2.1 09/12/2022    CHOLHDL 1.9 07/16/2021       Lab Results   Component Value Date    WBC 4.10 (L) 03/20/2025    HGB 12.0 (L) 03/20/2025    HCT 39.2 (L) 03/20/2025    MCV 95.6 03/20/2025     (L) 03/20/2025         Current Medications[1]    Review of Systems   Constitutional:  Negative for chills, diaphoresis, fever and malaise/fatigue.   Respiratory:  Negative for cough and shortness of breath.    Cardiovascular:  Negative for chest pain, palpitations, orthopnea, leg swelling and PND.   Gastrointestinal:  Negative for abdominal pain, nausea and vomiting.   Musculoskeletal:  Negative for falls.   Neurological:  Negative for focal weakness and weakness.         Objective:   /78 (BP Location: Left arm, Patient Position: Sitting)   Pulse (!) 54   Ht 5' 6" (1.676 m)   Wt 54.9 kg (121 lb)   SpO2 96%   BMI 19.53 kg/m²     Physical Exam  Constitutional:       General: He is not in acute distress.     Appearance: Normal appearance. He is not ill-appearing.   Cardiovascular:      Rate and Rhythm: Regular rhythm. Bradycardia present.      Heart sounds: Normal heart sounds. No murmur heard.  Pulmonary:      " Effort: Pulmonary effort is normal.      Breath sounds: Normal breath sounds.   Musculoskeletal:      Cervical back: Neck supple. No rigidity.      Right lower leg: No edema.      Left lower leg: No edema.   Skin:     General: Skin is warm and dry.   Neurological:      Mental Status: He is alert.           Assessment:     1. Essential (primary) hypertension        2. Mixed hyperlipidemia        3. Bradycardia        4. Edema of both lower extremities              Plan:   Essential (primary) hypertension  Well controlled on current meds: losartan 100mg qd    Hyperlipidemia  On atorvastatin  Lipids per PCP    Bradycardia  Mild, asymptomatic  Not on any anna blocking agents    Edema of both lower extremities  EF 60-65%  Improved with discontinuation of amlodipine        Follow up with Dr. Glez in 3 months       [1]   Current Outpatient Medications:     acetaminophen (TYLENOL) 500 MG tablet, Take 500 mg by mouth every 6 (six) hours as needed for Pain., Disp: , Rfl:     losartan (COZAAR) 100 MG tablet, Take 1 tablet (100 mg total) by mouth once daily., Disp: 90 tablet, Rfl: 1    atorvastatin (LIPITOR) 20 MG tablet, Take 1 tablet (20 mg total) by mouth every evening., Disp: 90 tablet, Rfl: 0    furosemide (LASIX) 20 MG tablet, Take 0.5 tablets (10 mg total) by mouth once daily., Disp: 45 tablet, Rfl: 0

## 2025-03-24 ENCOUNTER — RESULTS FOLLOW-UP (OUTPATIENT)
Dept: FAMILY MEDICINE | Facility: CLINIC | Age: 89
End: 2025-03-24

## 2025-04-25 ENCOUNTER — TELEPHONE (OUTPATIENT)
Dept: UROLOGY | Facility: CLINIC | Age: 89
End: 2025-04-25
Payer: MEDICARE

## 2025-04-25 NOTE — TELEPHONE ENCOUNTER
----- Message from Jack sent at 4/25/2025 10:26 AM CDT -----  Regarding: pt call back  Who Called: Gavin MadsenPt states he was called and told to come in a couple hours before ruben 4/30 pt wants to know if he can get blood work done in [place] prior to coming to [place] instead of coming to [place] Preferred Method of Contact: Phone CallPatient's Preferred Phone Number on File: [phone]    Spoke with daughter, Jadyn Madsen, during call back.  I explained that he can have blood drawn at their local clinic as long as it is an [facility].  She verified that it is.  She then asked the name of the doctor that would be seeing her father.  I told her Dr. Wiggins.  She asked for the spelling as it seemed she was writing it down.  Appointment date is April 30, 2025.

## 2025-04-30 ENCOUNTER — OFFICE VISIT (OUTPATIENT)
Dept: UROLOGY | Facility: CLINIC | Age: 89
End: 2025-04-30
Payer: MEDICARE

## 2025-04-30 VITALS
SYSTOLIC BLOOD PRESSURE: 175 MMHG | OXYGEN SATURATION: 98 % | DIASTOLIC BLOOD PRESSURE: 72 MMHG | BODY MASS INDEX: 19.44 KG/M2 | WEIGHT: 121 LBS | HEIGHT: 66 IN | HEART RATE: 58 BPM

## 2025-04-30 DIAGNOSIS — N41.1 CHRONIC PROSTATITIS: ICD-10-CM

## 2025-04-30 DIAGNOSIS — C61 MALIGNANT NEOPLASM OF PROSTATE: Primary | ICD-10-CM

## 2025-04-30 PROCEDURE — 99213 OFFICE O/P EST LOW 20 MIN: CPT | Mod: S$PBB,,, | Performed by: UROLOGY

## 2025-04-30 PROCEDURE — 99999 PR PBB SHADOW E&M-EST. PATIENT-LVL III: CPT | Mod: PBBFAC,,, | Performed by: UROLOGY

## 2025-04-30 PROCEDURE — 99213 OFFICE O/P EST LOW 20 MIN: CPT | Mod: PBBFAC | Performed by: UROLOGY

## 2025-05-01 NOTE — PROGRESS NOTES
Assessment:   1. Malignant neoplasm of prostate    2. Chronic prostatitis         Plan:     The patient's PSA is 29.  He is asymptomatic.  He is not on any medical therapy and denies any lower urinary tract symptoms.  We are going to continue observation.  Return in 6 months..           Van Wiggins MD  Urology            UROLOGY HISTORY AND PHYSICAL EXAM    Subjective:      Patient ID: Gavin Madsen is a 89 y.o. male.    Chief Complaint::   Follow-up  Pertinent negatives include no headaches.     The patient is an 89-year-old male with a history of prostate cancer.  His PSA in October was 35.  His PSA this April is 29.  He is accompanied by his family and asymptomatic not reporting any lower urinary tract symptoms..  When asked specifically he is not straining to void not having the sensation of incomplete bladder emptying and not having any difficulties at night sleeping with nocturia..     IPSS Questionnaire (AUA-7):  Over the past month    1)  How often have you had a sensation of not emptying your bladder completely after you finish urinating?  0 - Not at all   2)  How often have you had to urinate again less than two hours after you finished urinating? 3 - About half the time   3)  How often have you found you stopped and started again several times when you urinated?  1 - Less than 1 time in 5   4) How difficult have you found it to postpone urination?  0 - Not at all   5) How often have you had a weak urinary stream?  0 - Not at all   6) How often have you had to push or strain to begin urination?  0 - Not at all   7) How many times did you most typically get up to urinate from the time you went to bed until the time you got up in the morning?  1 - 1 time   Total score:  0-7 mildly symptomatic    8-19 moderately symptomatic    20-35 severely symptomatic         Past Medical History:   Diagnosis Date    Elevated PSA     Hypertension     Malignant neoplasm of prostate      Past Surgical History:    Procedure Laterality Date    BIOPSY WITH TRANSRECTAL ULTRASOUND (TRUS) GUIDANCE Bilateral 04/11/2019    Aylin 4+3=7        Medications Ordered Prior to Encounter[1]     Review of patient's allergies indicates:   Allergen Reactions    Aspirin      Vitals:    04/30/25 1333   BP: (!) 175/72   Pulse: (!) 58          Review of Systems   Constitutional:  Negative for activity change.   Respiratory:  Negative for shortness of breath.    Genitourinary:  Negative for decreased urine volume, difficulty urinating, dysuria, frequency, hematuria and urgency.   Neurological:  Negative for headaches.   Psychiatric/Behavioral:  Negative for sleep disturbance.       Objective:     Physical Exam  Vitals and nursing note reviewed.   Constitutional:       Appearance: Normal appearance. He is normal weight.   HENT:      Head: Normocephalic and atraumatic.   Eyes:      General: No scleral icterus.     Conjunctiva/sclera: Conjunctivae normal.   Cardiovascular:      Rate and Rhythm: Normal rate.   Pulmonary:      Effort: Pulmonary effort is normal.   Abdominal:      General: There is no distension.   Musculoskeletal:         General: No deformity.   Skin:     General: Skin is warm and dry.      Findings: No rash.   Neurological:      General: No focal deficit present.      Mental Status: He is alert.   Psychiatric:         Mood and Affect: Mood normal.         Behavior: Behavior normal.         Thought Content: Thought content normal.         Judgment: Judgment normal.        Lab Results   Component Value Date    PSA 29.374 (H) 04/29/2025    PSA 35.200 (H) 10/02/2024    PSA 39.400 (H) 04/29/2024        CMP  Sodium   Date Value Ref Range Status   03/20/2025 141 136 - 145 mmol/L Final     Potassium   Date Value Ref Range Status   03/20/2025 4.2 3.5 - 5.1 mmol/L Final     Chloride   Date Value Ref Range Status   03/20/2025 108 (H) 98 - 107 mmol/L Final     CO2   Date Value Ref Range Status   03/20/2025 25 23 - 31 mmol/L Final      Glucose   Date Value Ref Range Status   03/20/2025 88 82 - 115 mg/dL Final     BUN   Date Value Ref Range Status   03/20/2025 15 8 - 26 mg/dL Final     Creatinine   Date Value Ref Range Status   03/20/2025 0.82 0.72 - 1.25 mg/dL Final     Calcium   Date Value Ref Range Status   03/20/2025 9.2 8.8 - 10.0 mg/dL Final     Total Protein   Date Value Ref Range Status   10/02/2024 7.4 6.4 - 8.2 g/dL Final     Albumin   Date Value Ref Range Status   10/02/2024 4.1 3.5 - 5.0 g/dL Final     Bilirubin, Total   Date Value Ref Range Status   10/02/2024 0.8 >0.0 - 1.2 mg/dL Final     Alk Phos   Date Value Ref Range Status   10/02/2024 74 45 - 115 U/L Final     AST   Date Value Ref Range Status   10/02/2024 21 15 - 37 U/L Final     ALT   Date Value Ref Range Status   10/02/2024 19 16 - 61 U/L Final     Anion Gap   Date Value Ref Range Status   03/20/2025 12 7 - 16 mmol/L Final     eGFR   Date Value Ref Range Status   03/20/2025 84 >=60 mL/min/1.73m2 Final     Comment:     Estimated GFR calculated using the CKD-EPI creatinine (2021) equation.      BMP  Lab Results   Component Value Date     03/20/2025    K 4.2 03/20/2025     (H) 03/20/2025    CO2 25 03/20/2025    BUN 15 03/20/2025    CREATININE 0.82 03/20/2025    CALCIUM 9.2 03/20/2025    ANIONGAP 12 03/20/2025    EGFRNORACEVR 84 03/20/2025                [1]  Current Outpatient Medications on File Prior to Visit   Medication Sig Dispense Refill    acetaminophen (TYLENOL) 500 MG tablet Take 500 mg by mouth every 6 (six) hours as needed for Pain.      atorvastatin (LIPITOR) 20 MG tablet Take 1 tablet (20 mg total) by mouth every evening. 90 tablet 0    furosemide (LASIX) 20 MG tablet Take 0.5 tablets (10 mg total) by mouth once daily. 45 tablet 0    losartan (COZAAR) 100 MG tablet Take 1 tablet (100 mg total) by mouth once daily. 90 tablet 1     No current facility-administered medications on file prior to visit.

## 2025-05-07 ENCOUNTER — TELEPHONE (OUTPATIENT)
Dept: FAMILY MEDICINE | Facility: CLINIC | Age: 89
End: 2025-05-07
Payer: MEDICARE

## 2025-05-07 NOTE — TELEPHONE ENCOUNTER
Returned call to pt's daughter, after reviewing the chart pt should not be out of medications until June 11, 2025 and at that time pt will be due for an office visit. Pt probably has another rx on hold at the pharmacy. Gave this information to pt's daughter, she voiced understanding. No further questions at this time.

## 2025-06-11 ENCOUNTER — OFFICE VISIT (OUTPATIENT)
Dept: FAMILY MEDICINE | Facility: CLINIC | Age: 89
End: 2025-06-11
Payer: MEDICARE

## 2025-06-11 VITALS
HEIGHT: 66 IN | SYSTOLIC BLOOD PRESSURE: 135 MMHG | WEIGHT: 116.5 LBS | OXYGEN SATURATION: 97 % | RESPIRATION RATE: 12 BRPM | BODY MASS INDEX: 18.72 KG/M2 | HEART RATE: 54 BPM | DIASTOLIC BLOOD PRESSURE: 73 MMHG

## 2025-06-11 DIAGNOSIS — R63.4 UNINTENTIONAL WEIGHT LOSS: ICD-10-CM

## 2025-06-11 DIAGNOSIS — C61 MALIGNANT NEOPLASM OF PROSTATE: Chronic | ICD-10-CM

## 2025-06-11 DIAGNOSIS — R00.1 BRADYCARDIA: Chronic | ICD-10-CM

## 2025-06-11 DIAGNOSIS — R60.0 EDEMA OF BOTH LOWER EXTREMITIES: Chronic | ICD-10-CM

## 2025-06-11 DIAGNOSIS — E78.5 HYPERLIPIDEMIA, UNSPECIFIED HYPERLIPIDEMIA TYPE: Primary | Chronic | ICD-10-CM

## 2025-06-11 PROCEDURE — 99214 OFFICE O/P EST MOD 30 MIN: CPT | Mod: ,,, | Performed by: NURSE PRACTITIONER

## 2025-06-11 RX ORDER — FUROSEMIDE 20 MG/1
10 TABLET ORAL DAILY
Qty: 45 TABLET | Refills: 0 | Status: SHIPPED | OUTPATIENT
Start: 2025-06-11 | End: 2025-09-09

## 2025-06-11 RX ORDER — ATORVASTATIN CALCIUM 20 MG/1
20 TABLET, FILM COATED ORAL NIGHTLY
Qty: 90 TABLET | Refills: 3 | Status: SHIPPED | OUTPATIENT
Start: 2025-06-11

## 2025-06-11 NOTE — PATIENT INSTRUCTIONS
Refills on routine medications   Follow up on 9/2/25  and as needed; routine medication will be due 9/9/25

## 2025-06-11 NOTE — PROGRESS NOTES
Clinic note     Patient name: Gavin Madsen is a 89 y.o. male   Chief compliant   Chief Complaint   Patient presents with    Medication Refill     Here for med refills. No problems voiced at this time.        Subjective     History of present illness   In clinic for routine follow up and medication refills   Denies any acute concerns at present     Past Medical History: HTN, bradycardia, prostate cancer      Cardiology: Dr Glez, last clinic note reviewed    Urology: Roney OLIVA     Weight is down 5 # from office visit in March, he states his appetite is good, denies any abdominal pain, n/v/d                 Social History[1]    Review of patient's allergies indicates:   Allergen Reactions    Aspirin        Past Medical History:   Diagnosis Date    Elevated PSA     Hypertension     Malignant neoplasm of prostate        Past Surgical History:   Procedure Laterality Date    BIOPSY WITH TRANSRECTAL ULTRASOUND (TRUS) GUIDANCE Bilateral 04/11/2019    Aylin 4+3=7        Family History   Problem Relation Name Age of Onset    Hypertension Father      Hypertension Mother      Diabetes Mother      Prostate cancer Brother      Cervical cancer Daughter      Hypertension Daughter      Lupus Son         Current Medications[2]    Review of Systems   Constitutional:  Positive for fatigue. Negative for activity change, appetite change, chills, fever and unexpected weight change.   Respiratory:  Negative for cough and shortness of breath.    Cardiovascular:  Negative for chest pain, palpitations and leg swelling.   Gastrointestinal:  Negative for abdominal pain, blood in stool, change in bowel habit, constipation, diarrhea, nausea and vomiting.   Genitourinary:  Negative for difficulty urinating and dysuria.   Musculoskeletal:  Negative for arthralgias, gait problem and myalgias.   Neurological:  Negative for dizziness, light-headedness, headaches and coordination difficulties.   Psychiatric/Behavioral:  Negative for confusion,  "dysphoric mood and sleep disturbance. The patient is not nervous/anxious.        Objective     /73 (Patient Position: Sitting)   Pulse (!) 54   Resp 12   Ht 5' 6" (1.676 m)   Wt 52.8 kg (116 lb 8 oz)   SpO2 97%   BMI 18.80 kg/m²     Physical Exam   Constitutional: He is oriented to person, place, and time. normal appearance. No distress.   HENT:   Head: Atraumatic.   Mouth/Throat: Mucous membranes are moist.   Cardiovascular: Regular rhythm. Bradycardia present. Pulmonary:      Effort: Pulmonary effort is normal. No respiratory distress.      Breath sounds: Normal breath sounds. No wheezing, rhonchi or rales.     Abdominal: Soft. Normal appearance and bowel sounds are normal. He exhibits no distension. There is no abdominal tenderness.   Musculoskeletal:         General: Normal range of motion.      Cervical back: Neck supple.      Right lower leg: No edema.      Left lower leg: No edema.   Neurological: He is alert and oriented to person, place, and time. Gait normal.   Skin: Skin is warm and dry.   Psychiatric: His behavior is normal. Mood normal.       Lab Results   Component Value Date    WBC 4.10 (L) 03/20/2025    HGB 12.0 (L) 03/20/2025    HCT 39.2 (L) 03/20/2025    MCV 95.6 03/20/2025     (L) 03/20/2025       CMP  Sodium   Date Value Ref Range Status   03/20/2025 141 136 - 145 mmol/L Final     Potassium   Date Value Ref Range Status   03/20/2025 4.2 3.5 - 5.1 mmol/L Final     Chloride   Date Value Ref Range Status   03/20/2025 108 (H) 98 - 107 mmol/L Final     CO2   Date Value Ref Range Status   03/20/2025 25 23 - 31 mmol/L Final     Glucose   Date Value Ref Range Status   03/20/2025 88 82 - 115 mg/dL Final     BUN   Date Value Ref Range Status   03/20/2025 15 8 - 26 mg/dL Final     Creatinine   Date Value Ref Range Status   03/20/2025 0.82 0.72 - 1.25 mg/dL Final     Calcium   Date Value Ref Range Status   03/20/2025 9.2 8.8 - 10.0 mg/dL Final     Total Protein   Date Value Ref Range " "Status   10/02/2024 7.4 6.4 - 8.2 g/dL Final     Albumin   Date Value Ref Range Status   10/02/2024 4.1 3.5 - 5.0 g/dL Final     Bilirubin, Total   Date Value Ref Range Status   10/02/2024 0.8 >0.0 - 1.2 mg/dL Final     Alk Phos   Date Value Ref Range Status   10/02/2024 74 45 - 115 U/L Final     AST   Date Value Ref Range Status   10/02/2024 21 15 - 37 U/L Final     ALT   Date Value Ref Range Status   10/02/2024 19 16 - 61 U/L Final     Anion Gap   Date Value Ref Range Status   03/20/2025 12 7 - 16 mmol/L Final     eGFR    Date Value Ref Range Status   11/12/2021 110 >=60 mL/min/1.73m² Final     eGFR   Date Value Ref Range Status   04/04/2022 78 >=60 mL/min/1.73m² Final     No results found for: "TSH"  Lab Results   Component Value Date    CHOL 131 10/02/2024    CHOL 134 09/12/2022    CHOL 117 07/16/2021     Lab Results   Component Value Date    HDL 76 (H) 10/02/2024    HDL 64 (H) 09/12/2022    HDL 63 (H) 07/16/2021     Lab Results   Component Value Date    LDLCALC 44 10/02/2024    LDLCALC 53 09/12/2022    LDLCALC 38 07/16/2021     Lab Results   Component Value Date    TRIG 56 10/02/2024    TRIG 86 09/12/2022    TRIG 78 07/16/2021     Lab Results   Component Value Date    CHOLHDL 1.7 10/02/2024    CHOLHDL 2.1 09/12/2022    CHOLHDL 1.9 07/16/2021     No results found for: "LABA1C", "HGBA1C"    No results found for: "[id number]", "FLUAMOLEC", "FLUBMOLEC", "MOLSTREPAPOC"  Any diagnostic testing results obtained in office or prior to appointment were reviewed were reviewed with patient.    Health Maintenance Due   Topic Date Due    Shingles Vaccine (1 of 2) Never done    RSV Vaccine (Age 60+ and Pregnant patients) (1 - 1-dose 75+ series) Never done    COVID-19 Vaccine (3 - 2024-25 season) 09/01/2024         Health Maintenance Topics with due status: Not Due       Topic Last Completion Date    TETANUS VACCINE 11/12/2021    Influenza Vaccine 02/13/2024    Lipid Panel 10/02/2024    PROSTATE-SPECIFIC " ANTIGEN 04/29/2025         Assessment and Plan   Hyperlipidemia, unspecified hyperlipidemia type  -     atorvastatin (LIPITOR) 20 MG tablet; Take 1 tablet (20 mg total) by mouth every evening.  Dispense: 90 tablet; Refill: 3    Edema of both lower extremities  -     furosemide (LASIX) 20 MG tablet; Take 0.5 tablets (10 mg total) by mouth once daily.  Dispense: 45 tablet; Refill: 0    Bradycardia    Malignant neoplasm of prostate  Comments:  Urology: Roney OLIVA    Unintentional weight loss          Patient Instructions  Patient Instructions     Refills on routine medications   Follow up on 9/2/25  and as needed; routine medication will be due 9/9/25                                       [1]   Social History  Tobacco Use    Smoking status: Never     Passive exposure: Never    Smokeless tobacco: Never   Substance Use Topics    Alcohol use: Never    Drug use: Never   [2]   Current Outpatient Medications:     acetaminophen (TYLENOL) 500 MG tablet, Take 500 mg by mouth every 6 (six) hours as needed for Pain., Disp: , Rfl:     losartan (COZAAR) 100 MG tablet, Take 1 tablet (100 mg total) by mouth once daily., Disp: 90 tablet, Rfl: 1    atorvastatin (LIPITOR) 20 MG tablet, Take 1 tablet (20 mg total) by mouth every evening., Disp: 90 tablet, Rfl: 3    furosemide (LASIX) 20 MG tablet, Take 0.5 tablets (10 mg total) by mouth once daily., Disp: 45 tablet, Rfl: 0

## 2025-06-18 ENCOUNTER — OFFICE VISIT (OUTPATIENT)
Dept: CARDIOLOGY | Facility: CLINIC | Age: 89
End: 2025-06-18
Payer: MEDICARE

## 2025-06-18 VITALS — BODY MASS INDEX: 18.92 KG/M2 | WEIGHT: 117.19 LBS

## 2025-06-18 DIAGNOSIS — I10 ESSENTIAL (PRIMARY) HYPERTENSION: Chronic | ICD-10-CM

## 2025-06-18 DIAGNOSIS — R60.0 EDEMA OF BOTH LOWER EXTREMITIES: Chronic | ICD-10-CM

## 2025-06-18 DIAGNOSIS — R00.1 BRADYCARDIA: Primary | Chronic | ICD-10-CM

## 2025-06-18 DIAGNOSIS — E78.2 MIXED HYPERLIPIDEMIA: Chronic | ICD-10-CM

## 2025-06-18 PROCEDURE — 99213 OFFICE O/P EST LOW 20 MIN: CPT | Mod: PBBFAC | Performed by: INTERNAL MEDICINE

## 2025-06-18 PROCEDURE — 99214 OFFICE O/P EST MOD 30 MIN: CPT | Mod: S$PBB,,, | Performed by: INTERNAL MEDICINE

## 2025-06-18 PROCEDURE — 99999 PR PBB SHADOW E&M-EST. PATIENT-LVL III: CPT | Mod: PBBFAC,,, | Performed by: INTERNAL MEDICINE

## 2025-06-18 NOTE — PROGRESS NOTES
PCP: Jeanette Mc FNP    Referring Provider:     Subjective:   Gavin Madsen is a 89 y.o. male with hx of HTN, HLD, bradycardia who presents for follow up.     Pt is active, walking with cane daily without provocation of chest pain, pressure or shortness of breath.  Lower ext edema has resolved since amlodipine was discontinued.  His family is monitoring his blood pressure at home, has been well controlled. He continues to experience mild swelling of both lower extremities during the day, resolves overnight.     Fhx: no premature CAD  Shx: Never smoker, no ETOH or drug use    EKG   Results for orders placed or performed in visit on 02/17/25   EKG 12-lead    Collection Time: 02/17/25  1:52 PM   Result Value Ref Range    QRS Duration 82 ms    [facility] QTC Calculation 377 ms    Narrative    Test Reason : R00.1,    Vent. Rate :  62 BPM     Atrial Rate :  62 BPM     P-R Int : 176 ms          QRS Dur :  82 ms      QT Int : 372 ms       P-R-T Axes :  96   8  59 degrees    QTcB Int : 377 ms    Normal sinus rhythm  Normal ECG  When compared with ECG of 25-Mar-2024 13:26,  No significant change was found  Confirmed by Shon Glez ([id number]) on 3/3/2025 10:17:31 AM    Referred By:            Confirmed By: Shon Glez     ECHO Results for orders placed during the hospital encounter of 08/25/23    Echo    Interpretation Summary    Left Ventricle: The left ventricle is normal in size. Normal wall thickness. Normal wall motion. There is normal systolic function with a visually estimated ejection fraction of 60 - 65%. There is normal diastolic function.    Right Ventricle: Mild right ventricular enlargement. Systolic function is normal.    Right Atrium: Right atrium is mildly dilated.    Mitral Valve: There is no stenosis.    Tricuspid Valve: There is mild regurgitation.    IVC/SVC: Normal venous pressure at 3 mmHg.    Pericardium: There is a trivial effusion. No indication of cardiac tamponade.    [facility] No results found for this or any  previous visit.        Lab Results   Component Value Date     03/20/2025    K 4.2 03/20/2025     (H) 03/20/2025    CO2 25 03/20/2025    BUN 15 03/20/2025    CREATININE 0.82 03/20/2025    CALCIUM 9.2 03/20/2025    ANIONGAP 12 03/20/2025    ESTGFRAFRICA 110 11/12/2021    EGFRNONAA 78 04/04/2022       Lab Results   Component Value Date    CHOL 131 10/02/2024    CHOL 134 09/12/2022    CHOL 117 07/16/2021     Lab Results   Component Value Date    HDL 76 (H) 10/02/2024    HDL 64 (H) 09/12/2022    HDL 63 (H) 07/16/2021     Lab Results   Component Value Date    LDLCALC 44 10/02/2024    LDLCALC 53 09/12/2022    LDLCALC 38 07/16/2021     Lab Results   Component Value Date    TRIG 56 10/02/2024    TRIG 86 09/12/2022    TRIG 78 07/16/2021     Lab Results   Component Value Date    CHOLHDL 1.7 10/02/2024    CHOLHDL 2.1 09/12/2022    CHOLHDL 1.9 07/16/2021       Lab Results   Component Value Date    WBC 4.10 (L) 03/20/2025    HGB 12.0 (L) 03/20/2025    HCT 39.2 (L) 03/20/2025    MCV 95.6 03/20/2025     (L) 03/20/2025         Current Medications[1]    Review of Systems   Constitutional:  Negative for chills, diaphoresis, fever and malaise/fatigue.   Respiratory:  Negative for cough and shortness of breath.    Cardiovascular:  Negative for chest pain, palpitations, orthopnea, leg swelling and PND.   Gastrointestinal:  Negative for abdominal pain, nausea and vomiting.   Musculoskeletal:  Negative for falls.   Neurological:  Negative for focal weakness and weakness.         Objective:   Wt 53.2 kg (117 lb 3.2 oz)   BMI 18.92 kg/m²     Physical Exam  Constitutional:       General: He is not in acute distress.     Appearance: Normal appearance. He is not ill-appearing.   Cardiovascular:      Rate and Rhythm: Regular rhythm. Bradycardia present.      Heart sounds: Normal heart sounds. No murmur heard.  Pulmonary:      Effort: Pulmonary effort is normal.      Breath sounds: Normal breath sounds.   Musculoskeletal:       Cervical back: Neck supple. No rigidity.      Right lower leg: No edema.      Left lower leg: No edema.   Skin:     General: Skin is warm and dry.   Neurological:      Mental Status: He is alert.           Assessment:     1. Bradycardia      remains asymptomatic, continue to monitor.      2. Essential (primary) hypertension      adequatey controlled on current meds.      3. Mixed hyperlipidemia      following with primary care, is at goal on atorvastatin.      4. Edema of both lower extremities      mild, has improved, recommend trial of compression stockings            Plan:   Trial compression stockings, on in AM, off at HS, follow up in six months, sooner if symptoms change.            [1]   Current Outpatient Medications:     acetaminophen (TYLENOL) 500 MG tablet, Take 500 mg by mouth every 6 (six) hours as needed for Pain., Disp: , Rfl:     atorvastatin (LIPITOR) 20 MG tablet, Take 1 tablet (20 mg total) by mouth every evening., Disp: 90 tablet, Rfl: 3    furosemide (LASIX) 20 MG tablet, Take 0.5 tablets (10 mg total) by mouth once daily., Disp: 45 tablet, Rfl: 0    losartan (COZAAR) 100 MG tablet, Take 1 tablet (100 mg total) by mouth once daily., Disp: 90 tablet, Rfl: 1